# Patient Record
Sex: FEMALE | Race: WHITE | NOT HISPANIC OR LATINO | Employment: FULL TIME | URBAN - METROPOLITAN AREA
[De-identification: names, ages, dates, MRNs, and addresses within clinical notes are randomized per-mention and may not be internally consistent; named-entity substitution may affect disease eponyms.]

---

## 2019-08-02 ENCOUNTER — OFFICE VISIT (OUTPATIENT)
Dept: OBGYN | Facility: CLINIC | Age: 29
End: 2019-08-02
Attending: OBSTETRICS & GYNECOLOGY
Payer: COMMERCIAL

## 2019-08-02 VITALS
DIASTOLIC BLOOD PRESSURE: 86 MMHG | BODY MASS INDEX: 22.18 KG/M2 | WEIGHT: 138 LBS | SYSTOLIC BLOOD PRESSURE: 130 MMHG | HEART RATE: 63 BPM | HEIGHT: 66 IN

## 2019-08-02 DIAGNOSIS — Z11.3 SCREENING EXAMINATION FOR VENEREAL DISEASE: ICD-10-CM

## 2019-08-02 DIAGNOSIS — Z00.00 VISIT FOR PREVENTIVE HEALTH EXAMINATION: Primary | ICD-10-CM

## 2019-08-02 DIAGNOSIS — Z12.4 SCREENING FOR MALIGNANT NEOPLASM OF CERVIX: ICD-10-CM

## 2019-08-02 DIAGNOSIS — Z01.419 ENCOUNTER FOR GYNECOLOGICAL EXAMINATION WITHOUT ABNORMAL FINDING: ICD-10-CM

## 2019-08-02 PROCEDURE — G0145 SCR C/V CYTO,THINLAYER,RESCR: HCPCS | Performed by: OBSTETRICS & GYNECOLOGY

## 2019-08-02 PROCEDURE — 87491 CHLMYD TRACH DNA AMP PROBE: CPT | Performed by: OBSTETRICS & GYNECOLOGY

## 2019-08-02 PROCEDURE — 87624 HPV HI-RISK TYP POOLED RSLT: CPT | Performed by: OBSTETRICS & GYNECOLOGY

## 2019-08-02 PROCEDURE — G0463 HOSPITAL OUTPT CLINIC VISIT: HCPCS | Mod: ZF

## 2019-08-02 PROCEDURE — 87591 N.GONORRHOEAE DNA AMP PROB: CPT | Performed by: OBSTETRICS & GYNECOLOGY

## 2019-08-02 SDOH — HEALTH STABILITY: MENTAL HEALTH: HOW OFTEN DO YOU HAVE A DRINK CONTAINING ALCOHOL?: MONTHLY OR LESS

## 2019-08-02 ASSESSMENT — PATIENT HEALTH QUESTIONNAIRE - PHQ9
SUM OF ALL RESPONSES TO PHQ QUESTIONS 1-9: 0
5. POOR APPETITE OR OVEREATING: NOT AT ALL

## 2019-08-02 ASSESSMENT — ANXIETY QUESTIONNAIRES
2. NOT BEING ABLE TO STOP OR CONTROL WORRYING: NOT AT ALL
1. FEELING NERVOUS, ANXIOUS, OR ON EDGE: NOT AT ALL
5. BEING SO RESTLESS THAT IT IS HARD TO SIT STILL: NOT AT ALL
3. WORRYING TOO MUCH ABOUT DIFFERENT THINGS: NOT AT ALL
6. BECOMING EASILY ANNOYED OR IRRITABLE: NOT AT ALL
GAD7 TOTAL SCORE: 0
7. FEELING AFRAID AS IF SOMETHING AWFUL MIGHT HAPPEN: NOT AT ALL

## 2019-08-02 ASSESSMENT — MIFFLIN-ST. JEOR: SCORE: 1362.71

## 2019-08-02 NOTE — PROGRESS NOTES
SUBJECTIVE   Soraida Meeks is a 29 year old G0, No LMP recorded. (Menstrual status: IUD)., here for an annual preventive exam.    Specific concerns today:  -- friability with paps, occasional postcoital spotting  -- due for pap    Gynecologic History  No LMP recorded. (Menstrual status: IUD).   Current contraception: LNG IUD  Desires pregnancy within 12 months: N  Current partner from [facility], met through med school friend  Denies history of STI  No results found for: PAP   History of abnormal Pap smear: remote h/o abnormal (19 or 20, normal follow up)    Obstetric History  OB History    Para Term  AB Living   0 0 0 0 0 0   SAB TAB Ectopic Multiple Live Births   0 0 0 0 0        Health Maintenance    There is no immunization history on file for this patient.  Health Maintenance   Topic Date Due     PREVENTIVE CARE VISIT  1989     PAP  1989     HIV SCREENING  2004     DTAP/TDAP/TD IMMUNIZATION (1 - Tdap) 2014     PHQ-2  2019     INFLUENZA VACCINE (1) 2019     IPV IMMUNIZATION  Aged Out     MENINGITIS IMMUNIZATION  Aged Out     No results found for: CHOL  No results found for: HDL  No results found for: LDL  No results found for: TSH    Colonoscopy n/a  Mammogram n/a    Past Medical History  Past Medical History:   Diagnosis Date     NO ACTIVE PROBLEMS        Past Surgical History  Past Surgical History:   Procedure Laterality Date     NO HISTORY OF SURGERY         Medications  Current Outpatient Medications   Medication     levonorgestrel (MIRENA) 20 MCG/24HR IUD     No current facility-administered medications for this visit.        Allergies   No Known Allergies    Social History  Social History     Socioeconomic History     Marital status: Single     Spouse name: Not on file     Number of children: Not on file     Years of education: Not on file     Highest education level: Not on file   Occupational History     Occupation: doctor     Employer: [employer]     Comment:  "gyn/onc fellow   Social Needs     Financial resource strain: Not on file     Food insecurity:     Worry: Not on file     Inability: Not on file     Transportation needs:     Medical: Not on file     Non-medical: Not on file   Tobacco Use     Smoking status: Never Smoker     Smokeless tobacco: Never Used   Substance and Sexual Activity     Alcohol use: Yes     Frequency: Monthly or less     Drug use: Never     Sexual activity: Yes     Partners: Male     Birth control/protection: IUD   Lifestyle     Physical activity:     Days per week: Not on file     Minutes per session: Not on file     Stress: Not on file   Relationships     Social connections:     Talks on phone: Not on file     Gets together: Not on file     Attends Muslim service: Not on file     Active member of club or organization: Not on file     Attends meetings of clubs or organizations: Not on file     Relationship status: Not on file     Intimate partner violence:     Fear of current or ex partner: Not on file     Emotionally abused: Not on file     Physically abused: Not on file     Forced sexual activity: Not on file   Other Topics Concern     Not on file   Social History Narrative    Grew up [facility]    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 No pertinent family history.  No family history of breast, uterine, ovarian or colon cancer.    Review of Systems  CONSTITUTIONAL: NEGATIVE for fever, chills  EYES: NEGATIVE for vision changes   RESP: NEGATIVE for significant cough or SOB  CV: NEGATIVE for chest pain, palpitations   GI: NEGATIVE for nausea, abdominal pain, heartburn, or change in bowel habits  : NEGATIVE for frequency, dysuria, or hematuria  MUSCULOSKELETAL: NEGATIVE for significant arthralgias or myalgia  NEURO: NEGATIVE for weakness, dizziness or paresthesias or headache    OBJECTIVE   /86   Pulse 63   Ht 1.676 m (5' 6\")   Wt 62.6 kg (138 lb)   BMI 22.27 kg/m  "   BMI: Body mass index is 22.27 kg/m .  General:  Alert, no distress   HEENT:  Normocephalic, without obvious abnormality  No thyromegaly   Breasts: Within normal limits bilaterally, no LAD   Lungs:  Clear to auscultation bilaterally   Heart:  Regular rate and rhythm, no murmur   Abdomen:  Soft, non-tender, non-distended, bowel sounds normal   Pelvic: -nefg  -vagina normal without discharge  -cervix normal in appearance, no masses, IUD strings seen through external os, much friability with pap, moderate amount ectropion  -uterus small, AV, mobile, NT  -no adnexal masses, NT  -anus, perineum wnl   Extremities:  normal       ASSESSMENT   Soraida Meeks is a 29 year old , annual preventive exam within normal limits.    PLAN   Age 19-39 Annual Preventive Exam  1.  Screening   Ng/Ct testing today   HIV, syphilis declined   Cotesting today    2.  Immunizations UTD    3.  Patient Education   a.  Additional teaching done at this visit included: birth control   b.  Discussed with patient risks/ benefits and treatment options of prescribed medications or other treatment modalities.   c.  Recommended folate 0.4 - 0.8 mg daily for women of reproductive age    RTC in one year for annual exam or with concerns.    Fay Alatorre MD MPH

## 2019-08-02 NOTE — PATIENT INSTRUCTIONS

## 2019-08-02 NOTE — LETTER
2019       RE: Soraida Meeks  [address]  [address]  [place] [address]     Dear Colleague,    Thank you for referring your patient, Soraida Meeks, to the [facility] at [facility]. Please see a copy of my visit note below.    SUBJECTIVE   Soraida Meeks is a 29 year old G0, No LMP recorded. (Menstrual status: IUD)., here for an annual preventive exam.    Specific concerns today:  -- friability with paps, occasional postcoital spotting  -- due for pap    Gynecologic History  No LMP recorded. (Menstrual status: IUD).   Current contraception: LNG IUD  Desires pregnancy within 12 months: N  Current partner from [facility], met through med school friend  Denies history of STI  No results found for: PAP   History of abnormal Pap smear: remote h/o abnormal (19 or 20, normal follow up)    Obstetric History  OB History    Para Term  AB Living   0 0 0 0 0 0   SAB TAB Ectopic Multiple Live Births   0 0 0 0 0        Health Maintenance    There is no immunization history on file for this patient.  Health Maintenance   Topic Date Due     PREVENTIVE CARE VISIT  1989     PAP  1989     HIV SCREENING  2004     DTAP/TDAP/TD IMMUNIZATION (1 - Tdap) 2014     PHQ-2  2019     INFLUENZA VACCINE (1) 2019     IPV IMMUNIZATION  Aged Out     MENINGITIS IMMUNIZATION  Aged Out     No results found for: CHOL  No results found for: HDL  No results found for: LDL  No results found for: TSH    Colonoscopy n/a  Mammogram n/a    Past Medical History  Past Medical History:   Diagnosis Date     NO ACTIVE PROBLEMS        Past Surgical History  Past Surgical History:   Procedure Laterality Date     NO HISTORY OF SURGERY         Medications  Current Outpatient Medications   Medication     levonorgestrel (MIRENA) 20 MCG/24HR IUD     No current facility-administered medications for this visit.        Allergies   No Known  Allergies    Social History  Social History     Socioeconomic History     Marital status: Single     Spouse name: Not on file     Number of children: Not on file     Years of education: Not on file     Highest education level: Not on file   Occupational History     Occupation: doctor     Employer: [employer]     Comment: gyn/onc fellow   Social Needs     Financial resource strain: Not on file     Food insecurity:     Worry: Not on file     Inability: Not on file     Transportation needs:     Medical: Not on file     Non-medical: Not on file   Tobacco Use     Smoking status: Never Smoker     Smokeless tobacco: Never Used   Substance and Sexual Activity     Alcohol use: Yes     Frequency: Monthly or less     Drug use: Never     Sexual activity: Yes     Partners: Male     Birth control/protection: IUD   Lifestyle     Physical activity:     Days per week: Not on file     Minutes per session: Not on file     Stress: Not on file   Relationships     Social connections:     Talks on phone: Not on file     Gets together: Not on file     Attends [employer] service: Not on file     Active member of club or organization: Not on file     Attends meetings of clubs or organizations: Not on file     Relationship status: Not on file     Intimate partner violence:     Fear of current or ex partner: Not on file     Emotionally abused: Not on file     Physically abused: Not on file     Forced sexual activity: Not on file   Other Topics Concern     Not on file   Social History Narrative    Grew up [facility] [facility]    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 No pertinent family history.  No family history of breast, uterine, ovarian or colon cancer.    Review of Systems  CONSTITUTIONAL: NEGATIVE for fever, chills  EYES: NEGATIVE for vision changes   RESP: NEGATIVE for significant cough or SOB  CV: NEGATIVE for chest pain, palpitations   GI: NEGATIVE for nausea, abdominal pain,  "heartburn, or change in bowel habits  : NEGATIVE for frequency, dysuria, or hematuria  MUSCULOSKELETAL: NEGATIVE for significant arthralgias or myalgia  NEURO: NEGATIVE for weakness, dizziness or paresthesias or headache    OBJECTIVE   /86   Pulse 63   Ht 1.676 m (5' 6\")   Wt 62.6 kg (138 lb)   BMI 22.27 kg/m     BMI: Body mass index is 22.27 kg/m .  General:  Alert, no distress   HEENT:  Normocephalic, without obvious abnormality  No thyromegaly   Breasts: Within normal limits bilaterally, no LAD   Lungs:  Clear to auscultation bilaterally   Heart:  Regular rate and rhythm, no murmur   Abdomen:  Soft, non-tender, non-distended, bowel sounds normal   Pelvic: -nefg  -vagina normal without discharge  -cervix normal in appearance, no masses, IUD strings seen through external os, much friability with pap, moderate amount ectropion  -uterus small, AV, mobile, NT  -no adnexal masses, NT  -anus, perineum wnl   Extremities:  normal       ASSESSMENT   Soraida Meeks is a 29 year old , annual preventive exam within normal limits.    PLAN   Age 19-39 Annual Preventive Exam  1.  Screening   Ng/Ct testing today   HIV, syphilis declined   Cotesting today    2.  Immunizations UTD    3.  Patient Education   a.  Additional teaching done at this visit included: birth control   b.  Discussed with patient risks/ benefits and treatment options of prescribed medications or other treatment modalities.   c.  Recommended folate 0.4 - 0.8 mg daily for women of reproductive age    RTC in one year for annual exam or with concerns.    Fay Alatorre MD, MPH          "

## 2019-08-03 ASSESSMENT — ANXIETY QUESTIONNAIRES: GAD7 TOTAL SCORE: 0

## 2019-08-04 LAB
C TRACH DNA SPEC QL NAA+PROBE: NEGATIVE
N GONORRHOEA DNA SPEC QL NAA+PROBE: NEGATIVE
SPECIMEN SOURCE: NORMAL
SPECIMEN SOURCE: NORMAL

## 2019-08-07 LAB
COPATH REPORT: NORMAL
PAP: NORMAL

## 2019-08-08 LAB
FINAL DIAGNOSIS: NORMAL
HPV HR 12 DNA CVX QL NAA+PROBE: NEGATIVE
HPV16 DNA SPEC QL NAA+PROBE: NEGATIVE
HPV18 DNA SPEC QL NAA+PROBE: NEGATIVE
SPECIMEN DESCRIPTION: NORMAL
SPECIMEN SOURCE CVX/VAG CYTO: NORMAL

## 2019-09-04 DIAGNOSIS — J30.89 SEASONAL ALLERGIC RHINITIS DUE TO OTHER ALLERGIC TRIGGER: Primary | ICD-10-CM

## 2019-09-04 DIAGNOSIS — L70.8 OTHER ACNE: ICD-10-CM

## 2019-09-04 RX ORDER — ESCITALOPRAM OXALATE 5 MG/1
5 TABLET ORAL DAILY
Qty: 90 TABLET | Refills: 0 | Status: SHIPPED | OUTPATIENT
Start: 2019-09-04 | End: 2020-02-05

## 2019-09-04 RX ORDER — TRETINOIN 0.5 MG/G
1 CREAM TOPICAL AT BEDTIME
COMMUNITY
End: 2019-09-04

## 2019-09-04 RX ORDER — TRETINOIN 0.5 MG/G
CREAM TOPICAL AT BEDTIME
Qty: 20 G | Refills: 0 | Status: SHIPPED | OUTPATIENT
Start: 2019-09-04 | End: 2022-03-23

## 2019-12-13 ENCOUNTER — OFFICE VISIT (OUTPATIENT)
Dept: FAMILY MEDICINE | Facility: CLINIC | Age: 29
End: 2019-12-13
Payer: COMMERCIAL

## 2019-12-13 VITALS
HEART RATE: 62 BPM | HEIGHT: 66 IN | RESPIRATION RATE: 16 BRPM | WEIGHT: 140.1 LBS | TEMPERATURE: 98.1 F | SYSTOLIC BLOOD PRESSURE: 116 MMHG | OXYGEN SATURATION: 99 % | BODY MASS INDEX: 22.52 KG/M2 | DIASTOLIC BLOOD PRESSURE: 78 MMHG

## 2019-12-13 DIAGNOSIS — R59.9 ENLARGED LYMPH NODES: Primary | ICD-10-CM

## 2019-12-13 DIAGNOSIS — R59.9 ENLARGED LYMPH NODES: ICD-10-CM

## 2019-12-13 LAB
ERYTHROCYTE [DISTWIDTH] IN BLOOD BY AUTOMATED COUNT: 11.9 % (ref 10–15)
HCT VFR BLD AUTO: 40.6 % (ref 35–47)
HGB BLD-MCNC: 13.8 G/DL (ref 11.7–15.7)
MCH RBC QN AUTO: 31.4 PG (ref 26.5–33)
MCHC RBC AUTO-ENTMCNC: 34 G/DL (ref 31.5–36.5)
MCV RBC AUTO: 93 FL (ref 78–100)
PLATELET # BLD AUTO: 130 10E9/L (ref 150–450)
RBC # BLD AUTO: 4.39 10E12/L (ref 3.8–5.2)
WBC # BLD AUTO: 4.3 10E9/L (ref 4–11)

## 2019-12-13 ASSESSMENT — MIFFLIN-ST. JEOR: SCORE: 1372.24

## 2019-12-13 ASSESSMENT — PAIN SCALES - GENERAL: PAINLEVEL: NO PAIN (0)

## 2019-12-13 NOTE — PROGRESS NOTES
"       HPI       Soraida Meeks is a 29 year old woman who presents for assessment of her left sided neck lymph nodes. She noticed an enlarged node on the left side when working out. Today she feels a slight sore throat. She did notice today that there a few more enlarged lymph nodes behind the largest one.   Chief Complaint   Patient presents with     Mass     Pt complains of a lump on the left side of her neck.     Problem, Medication and Allergy Lists were reviewed and updated if needed.    Patient is not an established patient of this clinic.           Review of Systems:   Review of Systems     Constitutional:  Negative for fever, chills and fatigue.   HENT:  Positive for neck mass.                Physical Exam:     Vitals:    12/13/19 1002   BP: 116/78   BP Location: Right arm   Patient Position: Sitting   Cuff Size: Adult Regular   Pulse: 62   Resp: 16   Temp: 98.1  F (36.7  C)   TempSrc: Oral   SpO2: 99%   Weight: 63.5 kg (140 lb 1.6 oz)   Height: 1.676 m (5' 6\")     Body mass index is 22.61 kg/m .  Vitals were reviewed and were normal.     Physical Exam  Constitutional:       Appearance: Normal appearance.   HENT:      Head: Normocephalic.      Right Ear: Hearing, tympanic membrane, ear canal and external ear normal.      Left Ear: Hearing, tympanic membrane, ear canal and external ear normal.      Nose: Nose normal.      Mouth/Throat:      Lips: Pink.      Mouth: Mucous membranes are moist.      Pharynx: Oropharynx is clear.   Musculoskeletal: Normal range of motion.   Lymphadenopathy:      Head:      Left side of head: Preauricular and posterior auricular adenopathy present.   Skin:     General: Skin is warm and dry.   Neurological:      General: No focal deficit present.      Mental Status: She is alert and oriented to person, place, and time.   Psychiatric:         Mood and Affect: Mood normal.         Behavior: Behavior normal.         Results:     US and lab pending.     Assessment and Plan     1. " Enlarged lymph nodes    - US Head Neck Soft Tissue; Future  - CBC with platelets; Future    There are no discontinued medications.  First, have your CBC and US completed. Next, I will call you with the results. Next, drink 70 oz water daily. Next, take a probiotic daily. Please consider hot pack your lymph nodes if uncomfortable. Thank you. Options for treatment and follow-up care were reviewed with the patient. Soraida Meeks  engaged in the decision making process and verbalized understanding of the options discussed and agreed with the final plan.  NIESHA Carlson, CNP  Addendum 12/16/2019 0820: I called and left a message stating that her CBC was normal and her US of her neck showed enlarged lymph nodes. If the lymph nodes have not resolved in 6-8 weeks, she should return for a follow up US.   NIESHA Carlson, CNP

## 2019-12-13 NOTE — PATIENT INSTRUCTIONS
First, have your CBC and US completed. Next, I will call you with the results. Next, drink 70 oz water daily. Next, take a probiotic daily. Please consider hot pack your lymph nodes if uncomfortable. Thank you.   [facility] Medication Refill Request Information:  * Please contact your pharmacy regarding ANY request for medication refills.  ** NP Clinic Prescription Fax = [phone]  * Please allow 3 business days for routine medication refills.  * Please allow 5 business days for controlled substance medication refills.     [facility] Test Result notification information:  *You will be notified with in 7-10 days of your appointment day regarding the results of your test.  If you are on MyChart you will be notified as soon as the provider has reviewed the results and signed off on them.    [facility]: [phone]

## 2019-12-13 NOTE — NURSING NOTE
Chief Complaint   Patient presents with     Mass     Pt complains of a lump on the left side of her neck.         Derick Patterson, EMT on 12/13/2019 at 10:02 AM

## 2019-12-14 ENCOUNTER — ANCILLARY PROCEDURE (OUTPATIENT)
Dept: ULTRASOUND IMAGING | Facility: CLINIC | Age: 29
End: 2019-12-14
Attending: NURSE PRACTITIONER
Payer: COMMERCIAL

## 2019-12-14 DIAGNOSIS — R59.9 ENLARGED LYMPH NODES: ICD-10-CM

## 2019-12-16 ASSESSMENT — ENCOUNTER SYMPTOMS
NECK MASS: 1
FATIGUE: 0
FEVER: 0
CHILLS: 0

## 2020-01-03 DIAGNOSIS — D69.6 THROMBOCYTOPENIA, UNSPECIFIED (H): Primary | ICD-10-CM

## 2020-01-03 DIAGNOSIS — R59.9 ENLARGED LYMPH NODES: ICD-10-CM

## 2020-01-03 DIAGNOSIS — D69.6 THROMBOCYTOPENIA (H): Primary | ICD-10-CM

## 2020-01-03 DIAGNOSIS — R59.9 SWELLING OF LYMPH NODES: ICD-10-CM

## 2020-01-03 LAB
BASOPHILS # BLD AUTO: 0.1 10E9/L (ref 0–0.2)
BASOPHILS NFR BLD AUTO: 1 %
DIFFERENTIAL METHOD BLD: NORMAL
EOSINOPHIL # BLD AUTO: 0.1 10E9/L (ref 0–0.7)
EOSINOPHIL NFR BLD AUTO: 0.7 %
ERYTHROCYTE [DISTWIDTH] IN BLOOD BY AUTOMATED COUNT: 12.4 % (ref 10–15)
HCT VFR BLD AUTO: 39.5 % (ref 35–47)
HETEROPH AB SER QL: POSITIVE
HGB BLD-MCNC: 13.5 G/DL (ref 11.7–15.7)
IMM GRANULOCYTES # BLD: 0 10E9/L (ref 0–0.4)
IMM GRANULOCYTES NFR BLD: 0.4 %
LYMPHOCYTES # BLD AUTO: 3.3 10E9/L (ref 0.8–5.3)
LYMPHOCYTES NFR BLD AUTO: 45.2 %
MCH RBC QN AUTO: 30.8 PG (ref 26.5–33)
MCHC RBC AUTO-ENTMCNC: 34.2 G/DL (ref 31.5–36.5)
MCV RBC AUTO: 90 FL (ref 78–100)
MONOCYTES # BLD AUTO: 0.7 10E9/L (ref 0–1.3)
MONOCYTES NFR BLD AUTO: 9.3 %
NEUTROPHILS # BLD AUTO: 3.2 10E9/L (ref 1.6–8.3)
NEUTROPHILS NFR BLD AUTO: 43.4 %
NRBC # BLD AUTO: 0 10*3/UL
NRBC BLD AUTO-RTO: 0 /100
PLATELET # BLD AUTO: 220 10E9/L (ref 150–450)
RBC # BLD AUTO: 4.38 10E12/L (ref 3.8–5.2)
WBC # BLD AUTO: 7.4 10E9/L (ref 4–11)

## 2020-01-27 ENCOUNTER — OFFICE VISIT (OUTPATIENT)
Dept: FAMILY MEDICINE | Facility: CLINIC | Age: 30
End: 2020-01-27
Payer: COMMERCIAL

## 2020-01-27 VITALS — TEMPERATURE: 97.4 F | SYSTOLIC BLOOD PRESSURE: 125 MMHG | DIASTOLIC BLOOD PRESSURE: 79 MMHG

## 2020-01-27 DIAGNOSIS — Z71.84 TRAVEL ADVICE ENCOUNTER: Primary | ICD-10-CM

## 2020-01-27 PROCEDURE — 90471 IMMUNIZATION ADMIN: CPT | Mod: GA | Performed by: NURSE PRACTITIONER

## 2020-01-27 PROCEDURE — 90734 MENACWYD/MENACWYCRM VACC IM: CPT | Mod: GA | Performed by: NURSE PRACTITIONER

## 2020-01-27 PROCEDURE — 90717 YELLOW FEVER VACCINE SUBQ: CPT | Mod: GA | Performed by: NURSE PRACTITIONER

## 2020-01-27 PROCEDURE — 90691 TYPHOID VACCINE IM: CPT | Mod: GA | Performed by: NURSE PRACTITIONER

## 2020-01-27 PROCEDURE — 99402 PREV MED CNSL INDIV APPRX 30: CPT | Mod: 25 | Performed by: NURSE PRACTITIONER

## 2020-01-27 PROCEDURE — 90472 IMMUNIZATION ADMIN EACH ADD: CPT | Mod: GA | Performed by: NURSE PRACTITIONER

## 2020-01-27 RX ORDER — ATOVAQUONE AND PROGUANIL HYDROCHLORIDE 250; 100 MG/1; MG/1
1 TABLET, FILM COATED ORAL DAILY
Qty: 12 TABLET | Refills: 0 | Status: SHIPPED | OUTPATIENT
Start: 2020-01-27 | End: 2022-03-23

## 2020-01-27 RX ORDER — AZITHROMYCIN 500 MG/1
500 TABLET, FILM COATED ORAL DAILY
Qty: 3 TABLET | Refills: 0 | Status: SHIPPED | OUTPATIENT
Start: 2020-01-27 | End: 2020-01-30

## 2020-01-27 NOTE — NURSING NOTE
"Chief Complaint   Patient presents with     [facility]     initial /79 (BP Location: Right arm)   Temp 97.4  F (36.3  C) (Oral)  Estimated body mass index is 22.61 kg/m  as calculated from the following:    Height as of 12/13/19: 1.676 m (5' 6\").    Weight as of 12/13/19: 63.5 kg (140 lb 1.6 oz).  BP completed using cuff size: regular.  L  arm      Health Maintenance that is potentially due pending provider review:  NONE    n/a    Dave Fontana ma  "

## 2020-01-27 NOTE — PROGRESS NOTES
Nurse Note      Itinerary:  [place] and [place]      Departure Date: 2/8/20      Return Date: 2/24/20      Length of Trip 2 weeks      Reason for Travel: Business           Urban or rural: both      Accommodations: Hotel        IMMUNIZATION HISTORY  Have you received any immunizations within the past 4 weeks?  No  Have you ever fainted from having your blood drawn or from an injection?  No  Have you ever had a fever reaction to vaccination?  No  Have you ever had any bad reaction or side effect from any vaccination?  No  Have you ever had hepatitis A or B vaccine?  Yes  Do you live (or work closely) with anyone who has AIDS, an AIDS-like condition, any other immune disorder or who is on chemotherapy for cancer?  No  Do you have a family history of immunodeficiency?  No  Have you received any injection of immune globulin or any blood products during the past 12 months?  No    Patient roomed by   Dave Meeks is a 29 year old female seen today alone for counsultation for international travel to the stated countries.   Patient will be departing in  12 day(s) and  traveling [employer] gyn group.      Patient itinerary :  will be in the urban region of  [place] and then [place]  which presents risk for Malaria and Yellow Fever. exposure.      Patient's activities will include hospital/medical .    Patient's country of birth is [place]    Special medical concerns: none  Pre-travel questionnaire was completed by patient and reviewed by provider.     Vitals: /79 (BP Location: Right arm)   Temp 97.4  F (36.3  C) (Oral)   BMI= There is no height or weight on file to calculate BMI.    EXAM:  General:  Well-nourished, well-developed in no acute distress.  Appears to be stated age, interacts appropriately and expresses understanding of information given to patient.    Current Outpatient Medications   Medication Sig Dispense Refill     escitalopram (LEXAPRO) 5 MG tablet Take 1 tablet (5  mg) by mouth daily (Patient not taking: Reported on 12/13/2019) 90 tablet 0     levonorgestrel (MIRENA) 20 MCG/24HR IUD 1 each by Intrauterine route once       tretinoin (RETIN-A) 0.05 % external cream Apply topically At Bedtime (Patient not taking: Reported on 12/13/2019) 20 g 0     There is no problem list on file for this patient.    No Known Allergies      Immunizations discussed include:   Hepatitis A:  Up to date  Hepatitis B: Up to date  Influenza: Up to date  Typhoid: Ordered/given today, risks, benefits and side effects reviewed  Rabies: Up to date  Yellow Fever: Stamaril Ordered/given today - consent completed, side effects, precautions, allergies, risks discussed. Patient expressed understanding.  Tajik Encephalitis: Not indicated  Meningococcus: Ordered/given today, risks, benefits and side effects reviewed  Tetanus/Diphtheria: Up to date  Measles/Mumps/Rubella: Up to date  Cholera: Not needed  Polio: Up to date  Pneumococcal: Under age of 65  Varicella: Immune by disease history per patient report  Zostavax:  Not indicated  Shingrix: Not indicated  HPV:  Not indicated  TB:  Low risk     Stamaril Informed Consent    The patient was provided with a copy of the IRB-approved consent form and all questions were answered before the patient agreed to participate by signing the informed consent document.   A copy of the form was provided to the patient.    Date: January 27, 2020   Consent Version Date: 5/10/2017   Consent Obtained by:  Margaret Newsome CNP (Lori)     HIPAA:  Yes  HIPAA Authorization Signed Date: January 27, 2020       Inclusion/Exclusion Criteria:    (Similar to Yellow Fever-VAX)      The patient met all of the following inclusion criteria in order to be eligible for the Stamaril vaccination under this EAP (Expanded Access Investigational New Drug Program)           At increased risk for YF, including researchers, laboratory workers, vaccine production staff, and those who are traveling within  30 days to a YF-endemic region or to a country requiring proof of YF vaccination under IHRs (International Health Regulations)?       Yes     Patient is greater than or equal to 9 months of age on the day of vaccination?     Yes     Patient is greater than or equal to 18 years of age and signed and dated the Consent Forms?     Yes     Patient is < 18 years of age and parent(s)/guardian(s) signed and dated the Consent Forms?      Patient is 7 years to < 18 years of age and signed and dated the Assent form?        No Assent is required.  Patient is <7 years of age.     No      No      N/A     The patient did not meet any of the following criteria that would have excluded the patient from receiving the Stamaril vaccination under this EAP              Patient is less than 9 months of age.       No     The patient is breast-feeding and cannot stop nursing for at least 14 days after vaccination.    Note: Yellow Fever vaccine virus may be transmissible via breast milk by nursing mothers who are vaccinated during the final 2 weeks of pregnancy or post-partum.   Following transmission, infants may develop encephalitis.  The minimum time of discontinuation of breastfeeding for 14 days after vaccination is based on the expected clearance of live-attenuated vaccine virus.       No     The patient is immunosuppressed, whether congenital or idiopathic, including for example, leukemia, lymphoma, other malignancies, and patients who are receiving immunosuppressant medications (e.g. Systemic corticosteroids [greater than the standard dose of topical or inhaled steroids], alkylating drugs, antimetabolites, of other cytotoxic or immunomodulatory drugs) or radiation therapy or organ transplantation.       No     The patient has known hypersensitivity to the active substance or to any of the excipients of Stamaril vaccine or to eggs or chicken proteins.     No     The patient is symptomatic for human immunodeficiency virus (HIV)  infection     No     The patient is asymptomatic for HIV infection but accompanied by evidence of severe immune suppression    Note:  Evidence of severe immune suppression includes CD4+ T-cell counts < 200 cubic millimeters (or < 15% total lymphocytes in children aged < 6 years), or as determined by the health care provider.       No     The patient has a history of thymus dysfunction (including myasthenia gravis, thymoma, thymectomy)     No     Moderate or severe febrile illness or acute illness    Note: Participation in the EAP can be reassessed when moderate or severe febrile illness or acute illness has resolved.       No               Altitude Exposure on this trip: no  Past tolerance to Altitude: na    ASSESSMENT/PLAN:    ICD-10-CM    1. Travel advice encounter Z71.84 atovaquone-proguanil (MALARONE) 250-100 MG tablet     azithromycin (ZITHROMAX) 500 MG tablet     I have reviewed general recommendations for safe travel   including: food/water precautions, insect precautions, safer sex   practices given high prevalence of Zika, HIV and other STDs,   roadway safety. Educational materials and [employer] report provided.    Malaraia prophylaxis recommended: Malarone  Symptomatic treatment for traveler's diarrhea: azithromycin  Altitude illness prevention and treatment: none      Evacuation insurance advised and resources were provided to patient.    Total visit time 30 minutes  with over 50% of time spent counseling patient as detailed above.    Margaret Newsome CNP

## 2020-01-27 NOTE — PATIENT INSTRUCTIONS
Today January 27, 2020 you received the    Yellow Fever (YF)    Meningococcal (Menactra) Vaccine    Typhoid - injectable. This vaccine is valid for two years.   .    These appointments can be made as a NURSE ONLY visit.    **It is very important for the vaccinations to be given on the scheduled day(s), this helps ensure you receive the full effectiveness of the vaccine.**    Please call [facility] with any questions [phone]    Thank you for visiting [facility]

## 2020-02-05 DIAGNOSIS — Z78.9 PATIENT TRAVELS: Primary | ICD-10-CM

## 2020-02-05 RX ORDER — ONDANSETRON 4 MG/1
4 TABLET, ORALLY DISINTEGRATING ORAL EVERY 8 HOURS PRN
Qty: 10 TABLET | Refills: 1 | Status: SHIPPED | OUTPATIENT
Start: 2020-02-05 | End: 2020-07-01

## 2020-07-01 ENCOUNTER — TELEPHONE (OUTPATIENT)
Dept: ONCOLOGY | Facility: CLINIC | Age: 30
End: 2020-07-01

## 2020-07-01 DIAGNOSIS — Z78.9 PATIENT TRAVELS: ICD-10-CM

## 2020-07-01 RX ORDER — ONDANSETRON 4 MG/1
4 TABLET, ORALLY DISINTEGRATING ORAL EVERY 8 HOURS PRN
Qty: 10 TABLET | Refills: 1 | Status: SHIPPED | OUTPATIENT
Start: 2020-07-01 | End: 2022-03-23

## 2020-11-28 DIAGNOSIS — H10.30 ACUTE CONJUNCTIVITIS, UNSPECIFIED ACUTE CONJUNCTIVITIS TYPE, UNSPECIFIED LATERALITY: Primary | ICD-10-CM

## 2020-11-28 RX ORDER — ESCITALOPRAM OXALATE 5 MG/1
5 TABLET ORAL DAILY
Qty: 90 TABLET | Refills: 3 | Status: SHIPPED | OUTPATIENT
Start: 2020-11-28 | End: 2021-02-21

## 2020-11-28 RX ORDER — NEOMYCIN SULFATE, POLYMYXIN B SULFATE, BACITRACIN ZINC, HYDROCORTISONE 3.5; 10000; 400; 1 MG/G; [USP'U]/G; [USP'U]/G; MG/G
0.25 OINTMENT OPHTHALMIC 2 TIMES DAILY
Qty: 3.5 G | Refills: 0 | Status: SHIPPED | OUTPATIENT
Start: 2020-11-28 | End: 2020-12-05

## 2021-01-03 ENCOUNTER — HEALTH MAINTENANCE LETTER (OUTPATIENT)
Age: 31
End: 2021-01-03

## 2021-02-21 DIAGNOSIS — H10.30 ACUTE CONJUNCTIVITIS, UNSPECIFIED ACUTE CONJUNCTIVITIS TYPE, UNSPECIFIED LATERALITY: ICD-10-CM

## 2021-02-21 RX ORDER — ESCITALOPRAM OXALATE 5 MG/1
5 TABLET ORAL DAILY
Qty: 90 TABLET | Refills: 3 | Status: SHIPPED | OUTPATIENT
Start: 2021-02-21 | End: 2022-03-23

## 2021-08-10 DIAGNOSIS — R11.0 NAUSEA: Primary | ICD-10-CM

## 2021-08-10 RX ORDER — PYRIDOXINE HCL (VITAMIN B6) 25 MG
25 TABLET ORAL 3 TIMES DAILY
Qty: 90 TABLET | Refills: 1 | Status: SHIPPED | OUTPATIENT
Start: 2021-08-10 | End: 2022-03-23

## 2021-08-23 DIAGNOSIS — Z32.01 PREGNANCY TEST POSITIVE: Primary | ICD-10-CM

## 2021-09-07 DIAGNOSIS — Z32.01 PREGNANCY TEST POSITIVE: Primary | ICD-10-CM

## 2021-09-10 ENCOUNTER — TELEPHONE (OUTPATIENT)
Dept: OBGYN | Facility: CLINIC | Age: 31
End: 2021-09-10

## 2021-09-10 NOTE — TELEPHONE ENCOUNTER
Patient only wanted dating US.  Scheduled for  [id number] per pt choice.   She said she would schedule appt for obi later.     Thank you     Malvin

## 2021-09-10 NOTE — TELEPHONE ENCOUNTER
[facility] Call Center    Phone Message    May a detailed message be left on voicemail: yes     Reason for Call: Other: Pt would like a call back to get an ultra sound scheduled      Action Taken: Message routed to:  [facility] RADHA    Travel Screening: Not Applicable

## 2021-09-20 ENCOUNTER — ANCILLARY PROCEDURE (OUTPATIENT)
Dept: ULTRASOUND IMAGING | Facility: CLINIC | Age: 31
End: 2021-09-20
Attending: OBSTETRICS & GYNECOLOGY
Payer: COMMERCIAL

## 2021-09-20 DIAGNOSIS — Z32.01 PREGNANCY TEST POSITIVE: ICD-10-CM

## 2021-09-20 PROCEDURE — 76817 TRANSVAGINAL US OBSTETRIC: CPT

## 2021-09-23 DIAGNOSIS — Z34.01 NORMAL FIRST PREGNANCY CONFIRMED, CURRENTLY IN FIRST TRIMESTER: Primary | ICD-10-CM

## 2021-09-23 DIAGNOSIS — O21.9 NAUSEA AND VOMITING DURING PREGNANCY: Primary | ICD-10-CM

## 2021-09-23 RX ORDER — METOCLOPRAMIDE 5 MG/1
5 TABLET ORAL 4 TIMES DAILY PRN
Qty: 20 TABLET | Refills: 0 | Status: SHIPPED | OUTPATIENT
Start: 2021-09-23 | End: 2022-03-23

## 2021-09-24 ENCOUNTER — TRANSCRIBE ORDERS (OUTPATIENT)
Dept: MATERNAL FETAL MEDICINE | Facility: CLINIC | Age: 31
End: 2021-09-24

## 2021-09-24 DIAGNOSIS — O26.90 PREGNANCY RELATED CONDITION, ANTEPARTUM: Primary | ICD-10-CM

## 2021-10-10 ENCOUNTER — HEALTH MAINTENANCE LETTER (OUTPATIENT)
Age: 31
End: 2021-10-10

## 2021-10-13 ENCOUNTER — PRE VISIT (OUTPATIENT)
Dept: MATERNAL FETAL MEDICINE | Facility: CLINIC | Age: 31
End: 2021-10-13

## 2021-10-18 ENCOUNTER — OFFICE VISIT (OUTPATIENT)
Dept: MATERNAL FETAL MEDICINE | Facility: CLINIC | Age: 31
End: 2021-10-18
Attending: OBSTETRICS & GYNECOLOGY
Payer: COMMERCIAL

## 2021-10-18 ENCOUNTER — HOSPITAL ENCOUNTER (OUTPATIENT)
Dept: ULTRASOUND IMAGING | Facility: CLINIC | Age: 31
End: 2021-10-18
Attending: OBSTETRICS & GYNECOLOGY
Payer: COMMERCIAL

## 2021-10-18 DIAGNOSIS — O26.90 PREGNANCY RELATED CONDITION, ANTEPARTUM: ICD-10-CM

## 2021-10-18 DIAGNOSIS — Z36.9 ENCOUNTER FOR ANTENATAL SCREENING: Primary | ICD-10-CM

## 2021-10-18 LAB
ABO/RH(D): NORMAL
ALT SERPL W P-5'-P-CCNC: 22 U/L (ref 0–50)
ANION GAP SERPL CALCULATED.3IONS-SCNC: 4 MMOL/L (ref 3–14)
ANTIBODY SCREEN: NEGATIVE
AST SERPL W P-5'-P-CCNC: 16 U/L (ref 0–45)
BUN SERPL-MCNC: 10 MG/DL (ref 7–30)
CALCIUM SERPL-MCNC: 9 MG/DL (ref 8.5–10.1)
CHLORIDE BLD-SCNC: 107 MMOL/L (ref 94–109)
CO2 SERPL-SCNC: 26 MMOL/L (ref 20–32)
CREAT SERPL-MCNC: 0.64 MG/DL (ref 0.52–1.04)
ERYTHROCYTE [DISTWIDTH] IN BLOOD BY AUTOMATED COUNT: 12.1 % (ref 10–15)
GFR SERPL CREATININE-BSD FRML MDRD: >90 ML/MIN/1.73M2
GLUCOSE BLD-MCNC: 75 MG/DL (ref 70–99)
HCT VFR BLD AUTO: 40 % (ref 35–47)
HGB BLD-MCNC: 14.3 G/DL (ref 11.7–15.7)
MCH RBC QN AUTO: 31.8 PG (ref 26.5–33)
MCHC RBC AUTO-ENTMCNC: 35.8 G/DL (ref 31.5–36.5)
MCV RBC AUTO: 89 FL (ref 78–100)
PLATELET # BLD AUTO: 236 10E3/UL (ref 150–450)
POTASSIUM BLD-SCNC: 4.2 MMOL/L (ref 3.4–5.3)
RBC # BLD AUTO: 4.5 10E6/UL (ref 3.8–5.2)
RUBV IGG SERPL QL IA: 2.66 INDEX
RUBV IGG SERPL QL IA: POSITIVE
SODIUM SERPL-SCNC: 137 MMOL/L (ref 133–144)
SPECIMEN EXPIRATION DATE: NORMAL
T PALLIDUM AB SER QL: NONREACTIVE
WBC # BLD AUTO: 10.3 10E3/UL (ref 4–11)

## 2021-10-18 PROCEDURE — 86780 TREPONEMA PALLIDUM: CPT | Performed by: OBSTETRICS & GYNECOLOGY

## 2021-10-18 PROCEDURE — 36415 COLL VENOUS BLD VENIPUNCTURE: CPT | Performed by: OBSTETRICS & GYNECOLOGY

## 2021-10-18 PROCEDURE — 86803 HEPATITIS C AB TEST: CPT | Performed by: OBSTETRICS & GYNECOLOGY

## 2021-10-18 PROCEDURE — 96040 HC GENETIC COUNSELING, EACH 30 MINUTES: CPT | Performed by: GENETIC COUNSELOR, MS

## 2021-10-18 PROCEDURE — 86762 RUBELLA ANTIBODY: CPT | Performed by: OBSTETRICS & GYNECOLOGY

## 2021-10-18 PROCEDURE — 76813 OB US NUCHAL MEAS 1 GEST: CPT | Mod: 26 | Performed by: OBSTETRICS & GYNECOLOGY

## 2021-10-18 PROCEDURE — 87389 HIV-1 AG W/HIV-1&-2 AB AG IA: CPT | Performed by: OBSTETRICS & GYNECOLOGY

## 2021-10-18 PROCEDURE — 76813 OB US NUCHAL MEAS 1 GEST: CPT

## 2021-10-18 PROCEDURE — 87340 HEPATITIS B SURFACE AG IA: CPT | Performed by: OBSTETRICS & GYNECOLOGY

## 2021-10-18 PROCEDURE — 86901 BLOOD TYPING SEROLOGIC RH(D): CPT | Performed by: OBSTETRICS & GYNECOLOGY

## 2021-10-18 PROCEDURE — 84450 TRANSFERASE (AST) (SGOT): CPT | Performed by: OBSTETRICS & GYNECOLOGY

## 2021-10-18 PROCEDURE — 80048 BASIC METABOLIC PNL TOTAL CA: CPT | Performed by: OBSTETRICS & GYNECOLOGY

## 2021-10-18 PROCEDURE — 85027 COMPLETE CBC AUTOMATED: CPT | Performed by: OBSTETRICS & GYNECOLOGY

## 2021-10-18 PROCEDURE — 87086 URINE CULTURE/COLONY COUNT: CPT | Performed by: OBSTETRICS & GYNECOLOGY

## 2021-10-18 PROCEDURE — 84460 ALANINE AMINO (ALT) (SGPT): CPT | Performed by: OBSTETRICS & GYNECOLOGY

## 2021-10-18 NOTE — PROGRESS NOTES
"Please see \"Imaging\" tab under \"Chart Review\" for details of today's visit.    Meagan March    "

## 2021-10-18 NOTE — PROGRESS NOTES
[facility]  Genetic Counseling Consult    Patient: Soraida Meeks YOB: 1990   Date of Service: 10/18/21      Soraida Meeks was seen at [facility] for genetic consultation to discuss the options for routine screening for fetal chromosome abnormalities. The patient was accompanied by her partner, Surya to today's visit.       Impression/Plan:   1.  Soraida had an ultrasound and blood draw for NIPT through [facility] lab.  Results are expected within 7-10 days, and will be available in [employer].  We will contact her to discuss the results, and a copy will be forwarded to the office of the referring OB provider. Soraida provided verbal permission for results to be left on her voicemail. They have opted out of fetal sex reporting.     2. Maternal serum AFP (single marker screen) is recommended after 15 weeks to screen for open neural tube defects. A quad screen should not be performed.    Pregnancy History:   /Parity:    Age at Delivery: 31 year old  DYLAN: 2022, by Last Menstrual Period  Gestational Age: 12w3d    No significant complications or exposures were reported in the current pregnancy.    Medical History:   Soraida s reported medical history is not expected to impact pregnancy management or risks to fetal development.       Family History:   A three-generation pedigree was obtained, and is scanned under the  Media  tab.   The following significant findings were reported by Soraida:    Soraida's partner, Surya is 39 and healthy.   It was reported that Soraida's sister was born with congenital hip dysplasia requiring casting and surgery. Hip dysplasia is a congenital condition of the hip joint that is thought to be multifactorial, meaning both genetic and environmental aspects and a combination of these aspects lead to the condition. Given this is a second degree relative to the pregnancy, the risk may be  increased. They were encouraged to share this family history with their pediatrician.   Soraida and Surya reported a significant family history of cancer. Soraida's maternal aunts/uncles were reported to have a history of skin cancers including basal cell and melanoma. Her maternal uncle passed away at age 3 due to leukemia. Her maternal grandparents were both reported to have lung cancer and were noted to be smokers. Surya's father passed away at age 43 due to colon cancer and he had a history of crohn's disease. Surya's paternal grandmother passed away at age 84 and was reported to have a history of abdominal or ovarian cancer. Surya's maternal uncle was reported to have hodgkins lymphoma. Surya's maternal grandmother was also thought to have a history of cancer. We discussed how most cancer seen in families occurs sporadically, but about 5-10% may be due to an underlying genetic etiology. The couple was encouraged to share this family history information with their primary care providers to ensure appropriate screening. They were also made aware of the [facility] cancer risk management clinic if they or their family members are interested in more information. Surya shared that he has undergone colonoscopy screening and was found to have ~6 polyps.     Soraida's paternal uncle was reported to have a history of seizures as well as mental and physical disabilities. Soraida believes he was born healthy and his condition was acquired. Seizure disorders can be isolated or part of a broader genetic syndrome. In the absence of an identified cause, risk assessment is challenging. They were encouraged to share this family history with their pediatrician.     Surya's paternal first cousin was reported to have Down syndrome. Over 95% of cases of Down syndrome result from trisomy 21 caused by nondisjunction.  Rarely, Down syndrome occurs due to a translocation involving chromosome 21, which, if carried by other family  members, puts them at increased risk to have a baby with Down syndrome. This is likely a sporadic case in his cousin, however a familial translocation cannot be excluded without further details of the affected individual.     Surya's maternal aunt was reported to have intellectual disability secondary to an event at birth.     Otherwise, the reported family history is negative for multiple miscarriages, stillbirths, birth defects, intellectual disability, known genetic conditions, and consanguinity.       Carrier Screening:   The patient reports that she and the father of the pregnancy have  ancestry:     Cystic fibrosis is an autosomal recessive genetic condition that occurs with increased frequency in individuals of  ancestry and carrier screening for this condition is available.  In addition,  screening in the [facility] includes cystic fibrosis.    The patient reports that she is of Mediterranean ancestry:     The hemoglobinopathies are a group of genetic blood diseases that occur with increased frequency in individuals of Mediterranean ancestry and carrier screening for these conditions is available.  Carrier screening for the hemoglobinopathies includes a CBC with red blood cell indices, a ferritin level, and a quantitative hemoglobin electrophoresis or HPLC.  In addition,  screening in the [facility] includes many of the hemoglobinopathies.      Expanded carrier screening for mutations in a large panel of genes associated with autosomal recessive conditions including cystic fibrosis, spinal muscular atrophy, and others, is now available.      The patient was not certain about whether to pursue carrier screening today.  She was provided with a brochure about her testing options, and contact information if she has questions or wishes to pursue screening.       Risk Assessment for Chromosome Conditions:   We explained that the risk for fetal chromosome abnormalities  increases with maternal age. We discussed specific features of common chromosome abnormalities, including Down syndrome, trisomy 13, trisomy 18, and sex chromosome trisomies.      - At age 31 at midtrimester, the risk to have a baby with Down syndrome is 1 in 508.     - At age 31 at midtrimester, the risk to have a baby with any chromosome abnormality is 1 in 254.        Testing Options:   We discussed the following options:   First trimester screening    First trimester ultrasound with nuchal translucency and nasal bone assessments, maternal plasma hCG, SUMA-A, and AFP measurement    Screens for fetal trisomy 21, trisomy 13, and trisomy 18    Cannot screen for open neural tube defects; maternal serum AFP after 15 weeks is recommended     Non-invasive Prenatal Testing (NIPT)    Maternal plasma cell-free DNA testing; first trimester ultrasound with nuchal translucency and nasal bone assessment is recommended, when appropriate    Screens for fetal trisomy 21, trisomy 13, trisomy 18, and sex chromosome aneuploidy    Cannot screen for open neural tube defects; maternal serum AFP after 15 weeks is recommended     Chorionic villus sampling (CVS)    Invasive procedure typically performed in the first trimester by which placental villi are obtained for the purpose of chromosome analysis and/or other prenatal genetic analysis    Diagnostic results; >99% sensitivity for fetal chromosome abnormalities    Cannot test for open neural tube defects; maternal serum AFP after 15 weeks is recommended     Genetic Amniocentesis    Invasive procedure typically performed in the second trimester by which amniotic fluid is obtained for the purpose of chromosome analysis and/or other prenatal genetic analysis    Diagnostic results; >99% sensitivity for fetal chromosome abnormalities    AFAFP measurement tests for open neural tube defects     Comprehensive (Level II) ultrasound: Detailed ultrasound performed between 18-22 weeks gestation to  screen for major birth defects and markers for aneuploidy.      We reviewed the benefits and limitations of this testing.  Screening tests provide a risk assessment specific to the pregnancy for certain fetal chromosome abnormalities, but cannot definitively diagnose or exclude a fetal chromosome abnormality.  Follow-up genetic counseling and consideration of diagnostic testing is recommended with any abnormal screening result.     Diagnostic tests carry inherent risks- including risk of miscarriage- that require careful consideration.  These tests can detect fetal chromosome abnormalities with greater than 99% certainty.  Results can be compromised by maternal cell contamination or mosaicism, and are limited by the resolution of cytogenetic G-banding technology.  There is no screening nor diagnostic test that can detect all forms of birth defects or mental disability.     It was a pleasure to be involved with Soraida manning care. Face-to-face time of the meeting was 35 minutes.    Solange Poe MS, [facility]  Licensed Genetic Counselor  [facility]  Maternal Fetal Medicine  Ph: [phone]  [email or web address]

## 2021-10-19 LAB
BACTERIA UR CULT: NO GROWTH
HBV SURFACE AG SERPL QL IA: NONREACTIVE
HCV AB SERPL QL IA: NONREACTIVE
HIV 1+2 AB+HIV1 P24 AG SERPL QL IA: NONREACTIVE

## 2021-10-22 ENCOUNTER — TELEPHONE (OUTPATIENT)
Dept: MATERNAL FETAL MEDICINE | Facility: CLINIC | Age: 31
End: 2021-10-22

## 2021-10-22 NOTE — TELEPHONE ENCOUNTER
Soraida called requesting the RQ number for her [employer] test so she could set up her portal to elect patient pay. Asked thata voicemail be left with the information. Called and left voicemail with [phone].     Delia Sarmiento MS, [facility]  Licensed Genetic Counselor   [facility]  Maternal Fetal Medicine  [email or web address]  Office: [phone]  Pager [phone]  MFM: [phone]   Fax: [phone]

## 2021-10-25 ENCOUNTER — TELEPHONE (OUTPATIENT)
Dept: MATERNAL FETAL MEDICINE | Facility: CLINIC | Age: 31
End: 2021-10-25

## 2021-10-25 LAB — SCANNED LAB RESULT: NORMAL

## 2021-11-10 ENCOUNTER — OFFICE VISIT (OUTPATIENT)
Dept: OBGYN | Facility: CLINIC | Age: 31
End: 2021-11-10
Attending: OBSTETRICS & GYNECOLOGY
Payer: COMMERCIAL

## 2021-11-10 VITALS
DIASTOLIC BLOOD PRESSURE: 82 MMHG | HEART RATE: 80 BPM | BODY MASS INDEX: 23.4 KG/M2 | WEIGHT: 145 LBS | SYSTOLIC BLOOD PRESSURE: 129 MMHG

## 2021-11-10 DIAGNOSIS — Z34.01 NORMAL FIRST PREGNANCY CONFIRMED, CURRENTLY IN FIRST TRIMESTER: ICD-10-CM

## 2021-11-10 DIAGNOSIS — Z11.3 ROUTINE SCREENING FOR STI (SEXUALLY TRANSMITTED INFECTION): Primary | ICD-10-CM

## 2021-11-10 DIAGNOSIS — Z34.02 ENCOUNTER FOR SUPERVISION OF NORMAL FIRST PREGNANCY IN SECOND TRIMESTER: ICD-10-CM

## 2021-11-10 PROCEDURE — 99207 PR PRENATAL VISIT: CPT | Performed by: OBSTETRICS & GYNECOLOGY

## 2021-11-10 PROCEDURE — G0463 HOSPITAL OUTPT CLINIC VISIT: HCPCS

## 2021-11-10 PROCEDURE — 87591 N.GONORRHOEAE DNA AMP PROB: CPT | Performed by: OBSTETRICS & GYNECOLOGY

## 2021-11-10 PROCEDURE — 87491 CHLMYD TRACH DNA AMP PROBE: CPT | Performed by: OBSTETRICS & GYNECOLOGY

## 2021-11-10 ASSESSMENT — PAIN SCALES - GENERAL: PAINLEVEL: NO PAIN (0)

## 2021-11-10 NOTE — LETTER
11/10/2021       RE: Soraida Meeks  [address] [id number]     Dear Colleague,    Thank you for referring your patient, Soraida Meeks, to the [facility] [place] at [facility]. Please see a copy of my visit note below.    SUBJECTIVE   Feeling overall pretty good, nocturia but denies n/v  No FM yet, no further bleeding  denies ctx/LOF/vb/preE sx    OBJECTIVE   /82   Pulse 80   Wt 65.8 kg (145 lb)   LMP 2021   Breastfeeding No   BMI 23.40 kg/m      See Ob Flowsheet    ASSESSMENT & PLAN   31 year old  at 15w0d, FIONA.    Patient Active Problem List    Diagnosis Date Noted     Supervision of normal first pregnancy 10/20/2021     Priority: High     ARDHA DEL TORO pt    Partner's name: Surya    [x] NOB folder  [x] Dating: changed by 7 week ultrasound (long cycles)  [x] Rh: pos  [x] Rubella immune  [x] Hep B immune  [x] Pap: NILM 2019, plan PP    [x] First tri screen: wnl  [x] cffDNA: wnl  [ ] AFP ordered today  [ ] Fetal anatomy US:  _____________________________________  [ ] EOB folder  [ ] PP Contraception plan:  [ ] Labor plans:  [ ] Infant feeding plan:  [ ] Influenza vaccine  [ ] COVID vaccine  [ ] TDAP vaccine  [ ] GCT:  ________________________________________  [ ] GBS:  [ ] OTC PP meds sent         RTC 4 weeks    Fay Alatorre MD MPH

## 2021-11-10 NOTE — NURSING NOTE
Chief Complaint   Patient presents with     Prenatal Care     FIONA 15 weeks and 5 days   Lupe Marroquin LPN

## 2021-11-10 NOTE — PROGRESS NOTES
SUBJECTIVE   Feeling overall pretty good, nocturia but denies n/v  No FM yet, no further bleeding  denies ctx/LOF/vb/preE sx    OBJECTIVE   /82   Pulse 80   Wt 65.8 kg (145 lb)   LMP 2021   Breastfeeding No   BMI 23.40 kg/m      See Ob Flowsheet    ASSESSMENT & PLAN   31 year old  at 15w0d, FIONA.    Patient Active Problem List    Diagnosis Date Noted     Supervision of normal first pregnancy 10/20/2021     Priority: High     [facility] MD pt    Partner's name: Surya    [x] NOB folder  [x] Dating: changed by 7 week ultrasound (long cycles)  [x] Rh: pos  [x] Rubella immune  [x] Hep B immune  [x] Pap: NILM 2019, plan PP    [x] First tri screen: wnl  [x] cffDNA: wnl  [ ] AFP ordered today  [ ] Fetal anatomy US:  _____________________________________  [ ] EOB folder  [ ] PP Contraception plan:  [ ] Labor plans:  [ ] Infant feeding plan:  [ ] Influenza vaccine  [ ] COVID vaccine  [ ] TDAP vaccine  [ ] GCT:  ________________________________________  [ ] GBS:  [ ] OTC PP meds sent         RTC 4 weeks    Fay Alatorre MD MPH

## 2021-11-11 LAB
C TRACH DNA SPEC QL NAA+PROBE: NEGATIVE
N GONORRHOEA DNA SPEC QL NAA+PROBE: NEGATIVE

## 2021-11-30 ENCOUNTER — LAB (OUTPATIENT)
Dept: LAB | Facility: CLINIC | Age: 31
End: 2021-11-30
Payer: COMMERCIAL

## 2021-11-30 DIAGNOSIS — Z34.01 NORMAL FIRST PREGNANCY CONFIRMED, CURRENTLY IN FIRST TRIMESTER: ICD-10-CM

## 2021-11-30 PROCEDURE — 82105 ALPHA-FETOPROTEIN SERUM: CPT | Mod: 90 | Performed by: PATHOLOGY

## 2021-11-30 PROCEDURE — 99000 SPECIMEN HANDLING OFFICE-LAB: CPT | Performed by: PATHOLOGY

## 2021-11-30 PROCEDURE — 36415 COLL VENOUS BLD VENIPUNCTURE: CPT | Performed by: PATHOLOGY

## 2021-12-02 LAB
# FETUSES US: NORMAL
AFP MOM SERPL: 0.9
AFP SERPL-MCNC: 45 NG/ML
AGE - REPORTED: 32.8 YR
CURRENT SMOKER: NO
FAMILY MEMBER DISEASES HX: NO
GA METHOD: NORMAL
GA: NORMAL WK
IDDM PATIENT QL: NO
INTEGRATED SCN PATIENT-IMP: NORMAL
SPECIMEN DRAWN SERPL: NORMAL

## 2021-12-07 ENCOUNTER — HOSPITAL ENCOUNTER (OUTPATIENT)
Dept: ULTRASOUND IMAGING | Facility: CLINIC | Age: 31
End: 2021-12-07
Attending: OBSTETRICS & GYNECOLOGY
Payer: COMMERCIAL

## 2021-12-07 ENCOUNTER — OFFICE VISIT (OUTPATIENT)
Dept: MATERNAL FETAL MEDICINE | Facility: CLINIC | Age: 31
End: 2021-12-07
Attending: OBSTETRICS & GYNECOLOGY
Payer: COMMERCIAL

## 2021-12-07 DIAGNOSIS — O26.90 PREGNANCY RELATED CONDITION, ANTEPARTUM: Primary | ICD-10-CM

## 2021-12-07 DIAGNOSIS — Z36.9 ENCOUNTER FOR ANTENATAL SCREENING: ICD-10-CM

## 2021-12-07 PROCEDURE — 76811 OB US DETAILED SNGL FETUS: CPT

## 2021-12-07 PROCEDURE — 76811 OB US DETAILED SNGL FETUS: CPT | Mod: 26 | Performed by: OBSTETRICS & GYNECOLOGY

## 2021-12-07 NOTE — PROGRESS NOTES
The patient was seen for an ultrasound in the Maternal-Fetal Medicine Center at the [facility] today.  For a detailed report of the ultrasound examination, please see the ultrasound report which can be found under the imaging tab.    Nadia Montes MD  , OB/GYN  Maternal-Fetal Medicine  [phone] (Pager)

## 2022-01-30 ENCOUNTER — HEALTH MAINTENANCE LETTER (OUTPATIENT)
Age: 32
End: 2022-01-30

## 2022-01-30 DIAGNOSIS — Z34.02 ENCOUNTER FOR SUPERVISION OF NORMAL FIRST PREGNANCY IN SECOND TRIMESTER: Primary | ICD-10-CM

## 2022-01-30 PROBLEM — Z34.00 SUPERVISION OF NORMAL FIRST PREGNANCY: Status: ACTIVE | Noted: 2021-10-20

## 2022-02-02 ENCOUNTER — OFFICE VISIT (OUTPATIENT)
Dept: OBGYN | Facility: CLINIC | Age: 32
End: 2022-02-02
Attending: OBSTETRICS & GYNECOLOGY
Payer: COMMERCIAL

## 2022-02-02 ENCOUNTER — LAB (OUTPATIENT)
Dept: LAB | Facility: CLINIC | Age: 32
End: 2022-02-02
Attending: OBSTETRICS & GYNECOLOGY
Payer: COMMERCIAL

## 2022-02-02 VITALS
BODY MASS INDEX: 24.88 KG/M2 | HEIGHT: 66 IN | SYSTOLIC BLOOD PRESSURE: 125 MMHG | HEART RATE: 80 BPM | WEIGHT: 154.8 LBS | DIASTOLIC BLOOD PRESSURE: 80 MMHG

## 2022-02-02 DIAGNOSIS — Z34.02 ENCOUNTER FOR SUPERVISION OF NORMAL FIRST PREGNANCY IN SECOND TRIMESTER: ICD-10-CM

## 2022-02-02 DIAGNOSIS — R73.09 ELEVATED GLUCOSE TOLERANCE TEST: ICD-10-CM

## 2022-02-02 DIAGNOSIS — R73.09 ELEVATED GLUCOSE LEVEL: ICD-10-CM

## 2022-02-02 DIAGNOSIS — Z34.02 ENCOUNTER FOR SUPERVISION OF NORMAL FIRST PREGNANCY IN SECOND TRIMESTER: Primary | ICD-10-CM

## 2022-02-02 LAB
DEPRECATED CALCIDIOL+CALCIFEROL SERPL-MC: 38 UG/L (ref 20–75)
ERYTHROCYTE [DISTWIDTH] IN BLOOD BY AUTOMATED COUNT: 12.9 % (ref 10–15)
GLUCOSE 1H P 50 G GLC PO SERPL-MCNC: 144 MG/DL (ref 70–129)
HCT VFR BLD AUTO: 36 % (ref 35–47)
HGB BLD-MCNC: 12.6 G/DL (ref 11.7–15.7)
MCH RBC QN AUTO: 32.6 PG (ref 26.5–33)
MCHC RBC AUTO-ENTMCNC: 35 G/DL (ref 31.5–36.5)
MCV RBC AUTO: 93 FL (ref 78–100)
PLATELET # BLD AUTO: 233 10E3/UL (ref 150–450)
RBC # BLD AUTO: 3.87 10E6/UL (ref 3.8–5.2)
WBC # BLD AUTO: 10.6 10E3/UL (ref 4–11)

## 2022-02-02 PROCEDURE — 82306 VITAMIN D 25 HYDROXY: CPT

## 2022-02-02 PROCEDURE — 90715 TDAP VACCINE 7 YRS/> IM: CPT

## 2022-02-02 PROCEDURE — 85027 COMPLETE CBC AUTOMATED: CPT

## 2022-02-02 PROCEDURE — 82950 GLUCOSE TEST: CPT

## 2022-02-02 PROCEDURE — 36415 COLL VENOUS BLD VENIPUNCTURE: CPT

## 2022-02-02 PROCEDURE — 250N000011 HC RX IP 250 OP 636

## 2022-02-02 PROCEDURE — 99207 PR PRENATAL VISIT: CPT | Performed by: OBSTETRICS & GYNECOLOGY

## 2022-02-02 PROCEDURE — G0463 HOSPITAL OUTPT CLINIC VISIT: HCPCS | Mod: 25

## 2022-02-02 PROCEDURE — 90471 IMMUNIZATION ADMIN: CPT

## 2022-02-02 PROCEDURE — 86780 TREPONEMA PALLIDUM: CPT

## 2022-02-02 ASSESSMENT — PAIN SCALES - GENERAL: PAINLEVEL: NO PAIN (0)

## 2022-02-02 ASSESSMENT — MIFFLIN-ST. JEOR: SCORE: 1428.92

## 2022-02-02 NOTE — PROGRESS NOTES
"SUBJECTIVE   Winter SGO felt more SOB with altitude  miralax daily  Overall, feeling pretty good  Good FM  denies ctx/LOF/vb/preE sx    OBJECTIVE   /80   Pulse 80   Ht 1.676 m (5' 6\")   Wt 70.2 kg (154 lb 12.8 oz)   LMP 2021   BMI 24.99 kg/m      See Ob Flowsheet    ASSESSMENT & PLAN   31 year old  at 27w0d, FIONA.    Patient Active Problem List    Diagnosis Date Noted     Supervision of normal first pregnancy 10/20/2021     Priority: High     [facility] MD pt    Partner's name: Surya    [x] NOB folder  [x] Dating: changed by 7 week ultrasound (long cycles)  [x] Rh: pos  [x] Rubella immune  [x] Hep B immune  [x] Pap: NILM 2019, plan PP    [x] First tri screen: wnl  [x] cffDNA: wnl  [x] AFP wnl  [x] Fetal anatomy US: wnl  _____________________________________  [x] EOB folder reviewed  [ ] PP Contraception plan:  [x] Labor plans: 39 v. 40 weeks IOL  [x] Infant feeding plan: BF  [x] Influenza vaccine  [x] COVID vaccine  [x] TDAP vaccine - 22  [x] GCT - 144  [ ] GTT  ________________________________________  [ ] GBS:  [ ] OTC PP meds sent         RTC 2 weeks    Fay Alatorre MD MPH        "

## 2022-02-02 NOTE — LETTER
Date:February 3, 2022      Provider requested that no letter be sent. Do not send.       [facility]

## 2022-02-02 NOTE — LETTER
"2022       RE: Soraida Meeks  [address] [id number]     Dear Colleague,    Thank you for referring your patient, Soraida Meeks, to the [facility] [place] at [facility]. Please see a copy of my visit note below.    SUBJECTIVE   Winter SGO felt more SOB with altitude  miralax daily  Overall, feeling pretty good  Good FM  denies ctx/LOF/vb/preE sx    OBJECTIVE   /80   Pulse 80   Ht 1.676 m (5' 6\")   Wt 70.2 kg (154 lb 12.8 oz)   LMP 2021   BMI 24.99 kg/m      See Ob Flowsheet    ASSESSMENT & PLAN   31 year old  at 27w0d, FIONA.    Patient Active Problem List    Diagnosis Date Noted     Supervision of normal first pregnancy 10/20/2021     Priority: High     [facility] MD pt    Partner's name: Surya    [x] NOB folder  [x] Dating: changed by 7 week ultrasound (long cycles)  [x] Rh: pos  [x] Rubella immune  [x] Hep B immune  [x] Pap: NILM 2019, plan PP    [x] First tri screen: wnl  [x] cffDNA: wnl  [x] AFP wnl  [x] Fetal anatomy US: wnl  _____________________________________  [x] EOB folder reviewed  [ ] PP Contraception plan:  [x] Labor plans: 39 v. 40 weeks IOL  [x] Infant feeding plan: BF  [x] Influenza vaccine  [x] COVID vaccine  [x] TDAP vaccine - 22  [x] GCT - 144  [ ] GTT  ________________________________________  [ ] GBS:  [ ] OTC PP meds sent         RTC 2 weeks    Fay Alatorre MD MPH            Again, thank you for allowing me to participate in the care of your patient.      Sincerely,    Eli Alatorre MD      "

## 2022-02-03 ENCOUNTER — LAB (OUTPATIENT)
Dept: LAB | Facility: CLINIC | Age: 32
End: 2022-02-03
Payer: COMMERCIAL

## 2022-02-03 DIAGNOSIS — R73.09 ELEVATED GLUCOSE LEVEL: ICD-10-CM

## 2022-02-03 LAB
GESTATIONAL GTT 1 HR POST DOSE: 176 MG/DL (ref 60–179)
GESTATIONAL GTT 2 HR POST DOSE: 178 MG/DL (ref 60–154)
GESTATIONAL GTT 3 HR POST DOSE: 80 MG/DL (ref 60–139)
GLUCOSE P FAST SERPL-MCNC: 80 MG/DL (ref 60–94)
T PALLIDUM AB SER QL: NONREACTIVE

## 2022-02-03 PROCEDURE — 82951 GLUCOSE TOLERANCE TEST (GTT): CPT

## 2022-02-03 PROCEDURE — 82947 ASSAY GLUCOSE BLOOD QUANT: CPT

## 2022-02-03 PROCEDURE — 82950 GLUCOSE TEST: CPT

## 2022-02-03 PROCEDURE — 36415 COLL VENOUS BLD VENIPUNCTURE: CPT

## 2022-02-09 ENCOUNTER — LAB (OUTPATIENT)
Dept: LAB | Facility: CLINIC | Age: 32
End: 2022-02-09
Payer: COMMERCIAL

## 2022-02-09 DIAGNOSIS — R39.15 URINARY URGENCY: Primary | ICD-10-CM

## 2022-02-09 DIAGNOSIS — R39.15 URINARY URGENCY: ICD-10-CM

## 2022-02-09 LAB
ALBUMIN UR-MCNC: NEGATIVE MG/DL
APPEARANCE UR: CLEAR
BILIRUB UR QL STRIP: NEGATIVE
COLOR UR AUTO: ABNORMAL
GLUCOSE UR STRIP-MCNC: NEGATIVE MG/DL
HGB UR QL STRIP: NEGATIVE
KETONES UR STRIP-MCNC: NEGATIVE MG/DL
LEUKOCYTE ESTERASE UR QL STRIP: NEGATIVE
MUCOUS THREADS #/AREA URNS LPF: PRESENT /LPF
NITRATE UR QL: NEGATIVE
PH UR STRIP: 6.5 [PH] (ref 5–7)
RBC URINE: 0 /HPF
SP GR UR STRIP: 1.01 (ref 1–1.03)
UROBILINOGEN UR STRIP-MCNC: NORMAL MG/DL
WBC URINE: 1 /HPF

## 2022-02-09 PROCEDURE — 81001 URINALYSIS AUTO W/SCOPE: CPT

## 2022-02-19 DIAGNOSIS — Z34.03 ENCOUNTER FOR SUPERVISION OF NORMAL FIRST PREGNANCY IN THIRD TRIMESTER: Primary | ICD-10-CM

## 2022-02-19 RX ORDER — ESCITALOPRAM OXALATE 5 MG/1
5 TABLET ORAL DAILY
Qty: 90 TABLET | Refills: 1 | Status: SHIPPED | OUTPATIENT
Start: 2022-02-19 | End: 2022-05-06

## 2022-03-23 ENCOUNTER — OFFICE VISIT (OUTPATIENT)
Dept: OBGYN | Facility: CLINIC | Age: 32
End: 2022-03-23
Attending: OBSTETRICS & GYNECOLOGY
Payer: COMMERCIAL

## 2022-03-23 VITALS
BODY MASS INDEX: 26.31 KG/M2 | SYSTOLIC BLOOD PRESSURE: 128 MMHG | WEIGHT: 163 LBS | HEART RATE: 88 BPM | DIASTOLIC BLOOD PRESSURE: 81 MMHG

## 2022-03-23 DIAGNOSIS — Z34.03 ENCOUNTER FOR SUPERVISION OF NORMAL FIRST PREGNANCY IN THIRD TRIMESTER: ICD-10-CM

## 2022-03-23 PROCEDURE — 99207 PR PRENATAL VISIT: CPT | Performed by: OBSTETRICS & GYNECOLOGY

## 2022-03-23 PROCEDURE — G0463 HOSPITAL OUTPT CLINIC VISIT: HCPCS

## 2022-03-23 ASSESSMENT — PAIN SCALES - GENERAL: PAINLEVEL: NO PAIN (0)

## 2022-03-23 NOTE — LETTER
3/23/2022       RE: Soraida Meeks  [address] [place], [place] [id number]     Dear Colleague,    Thank you for referring your patient, Soraida Meeks, to the [facility] [place] at [facility]. Please see a copy of my visit note below.    Chief Complaint   Patient presents with     Prenatal Care     FIONA 34 weeks   Lupe Marroquin , RENNY    SUBJECTIVE   Episode of GI illness over the weekend, had some cramps/ctx, Cvx 1 cm and long at that time, resovled now  Good FM  Denies LOF/vb/preE sx    OBJECTIVE   /81   Pulse 88   Wt 73.9 kg (163 lb)   LMP 2021   Breastfeeding No   BMI 26.31 kg/m      See Ob Flowsheet    ASSESSMENT & PLAN   31 year old  at 34w0d, FIONA.    Patient Active Problem List    Diagnosis Date Noted     Supervision of normal first pregnancy 10/20/2021     Priority: High     [facility] MD pt    Partner's name: Surya    [x] NOB folder  [x] Dating: changed by 7 week ultrasound (long cycles)  [x] Rh: pos  [x] Rubella immune  [x] Hep B immune  [x] Pap: NILM 2019, plan PP    [x] First tri screen: wnl  [x] cffDNA: wnl  [x] AFP wnl  [x] Fetal anatomy US: wnl  _____________________________________  [x] EOB folder reviewed  [ ] PP Contraception plan:  [x] Labor plans: 39 v. 40 weeks IOL  [x] Infant feeding plan: BF  [x] Influenza vaccine  [x] COVID vaccine  [x] TDAP vaccine - 22  [x] GCT - 144  [x] GTT - wnl ( values elevated)  ________________________________________  [ ] GBS:  [ ] OTC PP meds sent         RTC 2 weeks, GBS that visit    Fay Alatorre MD MPH              Again, thank you for allowing me to participate in the care of your patient.      Sincerely,    Eli Alatorre MD

## 2022-03-23 NOTE — PROGRESS NOTES
SUBJECTIVE   Episode of GI illness over the weekend, had some cramps/ctx, Cvx 1 cm and long at that time, resovled now  Good FM  Denies LOF/vb/preE sx    OBJECTIVE   /81   Pulse 88   Wt 73.9 kg (163 lb)   LMP 2021   Breastfeeding No   BMI 26.31 kg/m      See Ob Flowsheet    ASSESSMENT & PLAN   31 year old  at 34w0d, FIONA.    Patient Active Problem List    Diagnosis Date Noted     Supervision of normal first pregnancy 10/20/2021     Priority: High     RADHA DEL TORO pt    Partner's name: Surya    [x] NOB folder  [x] Dating: changed by 7 week ultrasound (long cycles)  [x] Rh: pos  [x] Rubella immune  [x] Hep B immune  [x] Pap: NILM , plan PP    [x] First tri screen: wnl  [x] cffDNA: wnl  [x] AFP wnl  [x] Fetal anatomy US: wnl  _____________________________________  [x] EOB folder reviewed  [ ] PP Contraception plan:  [x] Labor plans: 39 v. 40 weeks IOL  [x] Infant feeding plan: BF  [x] Influenza vaccine  [x] COVID vaccine  [x] TDAP vaccine - 22  [x] GCT - 144  [x] GTT - wnl ( values elevated)  ________________________________________  [ ] GBS:  [ ] OTC PP meds sent         RTC 2 weeks, GBS that visit    Fay Alatorre MD MPH

## 2022-03-23 NOTE — LETTER
Date:March 24, 2022      Provider requested that no letter be sent. Do not send.       [facility]

## 2022-04-06 ENCOUNTER — OFFICE VISIT (OUTPATIENT)
Dept: OBGYN | Facility: CLINIC | Age: 32
End: 2022-04-06
Attending: OBSTETRICS & GYNECOLOGY
Payer: COMMERCIAL

## 2022-04-06 VITALS
HEART RATE: 84 BPM | SYSTOLIC BLOOD PRESSURE: 119 MMHG | DIASTOLIC BLOOD PRESSURE: 79 MMHG | BODY MASS INDEX: 26.47 KG/M2 | WEIGHT: 164 LBS

## 2022-04-06 DIAGNOSIS — Z34.03 ENCOUNTER FOR SUPERVISION OF NORMAL FIRST PREGNANCY IN THIRD TRIMESTER: ICD-10-CM

## 2022-04-06 DIAGNOSIS — Z34.03 PREGNANCY, SUPERVISION OF FIRST, THIRD TRIMESTER: Primary | ICD-10-CM

## 2022-04-06 PROCEDURE — 99207 PR PRENATAL VISIT: CPT | Performed by: OBSTETRICS & GYNECOLOGY

## 2022-04-06 PROCEDURE — 87653 STREP B DNA AMP PROBE: CPT | Performed by: OBSTETRICS & GYNECOLOGY

## 2022-04-06 PROCEDURE — G0463 HOSPITAL OUTPT CLINIC VISIT: HCPCS

## 2022-04-06 ASSESSMENT — PAIN SCALES - GENERAL: PAINLEVEL: NO PAIN (0)

## 2022-04-06 NOTE — LETTER
2022       RE: Soraida Meeks  [address] [id number]     Dear Colleague,    Thank you for referring your patient, Soraida Meeks, to the [facility]OMEN[facility] CL[facility] [place] at [facility]. Please see a copy of my visit note below.    Chief Complaint   Patient presents with     Prenatal Care     FIONA 36 weeks   Lupe Marroquin , RENNY    SUBJECTIVE   More pelvic girdle pressure, B-H but no painful ctx  Good FM  denies ctx/LOF/vb/preE sx    OBJECTIVE   /79   Pulse 84   Wt 74.4 kg (164 lb)   LMP 2021   Breastfeeding No   BMI 26.47 kg/m      See Ob Flowsheet  Cephalic by TAUS    ASSESSMENT & PLAN   31 year old  at 36w0d, FIONA.    Patient Active Problem List    Diagnosis Date Noted     Supervision of normal first pregnancy 10/20/2021     Priority: High     [facility] MD pt    Partner's name: Surya    [x] NOB folder  [x] Dating: changed by 7 week ultrasound (long cycles)  [x] Rh: pos  [x] Rubella immune  [x] Hep B immune  [x] Pap: NILM , plan PP    [x] First tri screen: wnl  [x] cffDNA: wnl  [x] AFP wnl  [x] Fetal anatomy US: wnl  _____________________________________  [x] EOB folder reviewed  [ ] PP Contraception plan:  [x] Labor plans: 39 v. 40 weeks IOL, thinking IOL week of May 2-  [x] Infant feeding plan: BF  [x] Influenza vaccine  [x] COVID vaccine  [x] TDAP vaccine - 22  [x] GCT - 144  [x] GTT - wnl ( values elevated)  ________________________________________  [x] GBS 22  [x] OTC meds          Plan IOL 39-40w    Fay Alatorre MD MPH              Again, thank you for allowing me to participate in the care of your patient.      Sincerely,    Eli Alatorre MD

## 2022-04-06 NOTE — PROGRESS NOTES
SUBJECTIVE   More pelvic girdle pressure, B-H but no painful ctx  Good FM  denies ctx/LOF/vb/preE sx    OBJECTIVE   /79   Pulse 84   Wt 74.4 kg (164 lb)   LMP 2021   Breastfeeding No   BMI 26.47 kg/m      See Ob Flowsheet  Cephalic by AI    ASSESSMENT & PLAN   31 year old  at 36w0d, FIONA.    Patient Active Problem List    Diagnosis Date Noted     Supervision of normal first pregnancy 10/20/2021     Priority: High     [facility] MD pt    Partner's name: Surya    [x] NOB folder  [x] Dating: changed by 7 week ultrasound (long cycles)  [x] Rh: pos  [x] Rubella immune  [x] Hep B immune  [x] Pap: NILM 2019, plan PP    [x] First tri screen: wnl  [x] cffDNA: wnl  [x] AFP wnl  [x] Fetal anatomy US: wnl  _____________________________________  [x] EOB folder reviewed  [ ] PP Contraception plan:  [x] Labor plans: 39 v. 40 weeks IOL, thinking IOL week of May 2-  [x] Infant feeding plan: BF  [x] Influenza vaccine  [x] COVID vaccine  [x] TDAP vaccine - 22  [x] GCT - 144  [x] GTT - wnl ( values elevated)  ________________________________________  [x] GBS 22  [x] OTC meds          Plan IOL 39-40w    Fay Alatorre MD MPH

## 2022-04-06 NOTE — LETTER
Date:April 7, 2022      Provider requested that no letter be sent. Do not send.       [facility]

## 2022-04-07 LAB — GP B STREP DNA SPEC QL NAA+PROBE: NEGATIVE

## 2022-04-15 DIAGNOSIS — Z34.90 ENCOUNTER FOR INDUCTION OF LABOR: Primary | ICD-10-CM

## 2022-04-27 ENCOUNTER — HOSPITAL ENCOUNTER (INPATIENT)
Facility: CLINIC | Age: 32
LOS: 2 days | Discharge: HOME | End: 2022-04-29
Attending: OBSTETRICS & GYNECOLOGY | Admitting: OBSTETRICS & GYNECOLOGY
Payer: COMMERCIAL

## 2022-04-27 ENCOUNTER — OFFICE VISIT (OUTPATIENT)
Dept: OBGYN | Facility: CLINIC | Age: 32
End: 2022-04-27
Attending: OBSTETRICS & GYNECOLOGY
Payer: COMMERCIAL

## 2022-04-27 ENCOUNTER — APPOINTMENT (OUTPATIENT)
Dept: LAB | Facility: CLINIC | Age: 32
End: 2022-04-27
Attending: OBSTETRICS & GYNECOLOGY
Payer: COMMERCIAL

## 2022-04-27 VITALS
SYSTOLIC BLOOD PRESSURE: 152 MMHG | BODY MASS INDEX: 27.24 KG/M2 | HEART RATE: 69 BPM | HEIGHT: 66 IN | WEIGHT: 169.5 LBS | DIASTOLIC BLOOD PRESSURE: 100 MMHG

## 2022-04-27 DIAGNOSIS — R03.0 ELEVATED BP WITHOUT DIAGNOSIS OF HYPERTENSION: Primary | ICD-10-CM

## 2022-04-27 PROBLEM — O13.9 GESTATIONAL HYPERTENSION: Status: ACTIVE | Noted: 2022-04-27

## 2022-04-27 LAB
ABO/RH(D): NORMAL
ALT SERPL W P-5'-P-CCNC: 29 U/L (ref 0–50)
ANION GAP SERPL CALCULATED.3IONS-SCNC: 11 MMOL/L (ref 3–14)
ANTIBODY SCREEN: NEGATIVE
AST SERPL W P-5'-P-CCNC: 21 U/L (ref 0–45)
BUN SERPL-MCNC: 11 MG/DL (ref 7–30)
CALCIUM SERPL-MCNC: 8.7 MG/DL (ref 8.5–10.1)
CHLORIDE BLD-SCNC: 104 MMOL/L (ref 94–109)
CO2 SERPL-SCNC: 21 MMOL/L (ref 20–32)
CREAT SERPL-MCNC: 0.63 MG/DL (ref 0.52–1.04)
CREAT UR-MCNC: 71 MG/DL
ERYTHROCYTE [DISTWIDTH] IN BLOOD BY AUTOMATED COUNT: 12.7 % (ref 10–15)
GFR SERPL CREATININE-BSD FRML MDRD: >90 ML/MIN/1.73M2
GLUCOSE BLD-MCNC: 83 MG/DL (ref 70–99)
HCT VFR BLD AUTO: 35.5 % (ref 35–47)
HGB BLD-MCNC: 12.3 G/DL (ref 11.7–15.7)
MCH RBC QN AUTO: 31.9 PG (ref 26.5–33)
MCHC RBC AUTO-ENTMCNC: 34.6 G/DL (ref 31.5–36.5)
MCV RBC AUTO: 92 FL (ref 78–100)
PLATELET # BLD AUTO: 210 10E3/UL (ref 150–450)
POTASSIUM BLD-SCNC: 3.7 MMOL/L (ref 3.4–5.3)
PROT UR-MCNC: 0.13 G/L
PROT/CREAT 24H UR: 0.18 G/G CR (ref 0–0.2)
RBC # BLD AUTO: 3.85 10E6/UL (ref 3.8–5.2)
SARS-COV-2 RNA RESP QL NAA+PROBE: NEGATIVE
SODIUM SERPL-SCNC: 136 MMOL/L (ref 133–144)
SPECIMEN EXPIRATION DATE: NORMAL
WBC # BLD AUTO: 9.8 10E3/UL (ref 4–11)

## 2022-04-27 PROCEDURE — 84450 TRANSFERASE (AST) (SGOT): CPT | Performed by: OBSTETRICS & GYNECOLOGY

## 2022-04-27 PROCEDURE — 120N000002 HC R&B MED SURG/OB UMMC

## 2022-04-27 PROCEDURE — G0463 HOSPITAL OUTPT CLINIC VISIT: HCPCS

## 2022-04-27 PROCEDURE — 10907ZC DRAINAGE OF AMNIOTIC FLUID, THERAPEUTIC FROM PRODUCTS OF CONCEPTION, VIA NATURAL OR ARTIFICIAL OPENING: ICD-10-PCS | Performed by: OBSTETRICS & GYNECOLOGY

## 2022-04-27 PROCEDURE — 250N000009 HC RX 250: Performed by: STUDENT IN AN ORGANIZED HEALTH CARE EDUCATION/TRAINING PROGRAM

## 2022-04-27 PROCEDURE — 59425 ANTEPARTUM CARE ONLY: CPT | Performed by: OBSTETRICS & GYNECOLOGY

## 2022-04-27 PROCEDURE — 36415 COLL VENOUS BLD VENIPUNCTURE: CPT | Performed by: OBSTETRICS & GYNECOLOGY

## 2022-04-27 PROCEDURE — 84460 ALANINE AMINO (ALT) (SGPT): CPT | Performed by: OBSTETRICS & GYNECOLOGY

## 2022-04-27 PROCEDURE — 86901 BLOOD TYPING SEROLOGIC RH(D): CPT | Performed by: STUDENT IN AN ORGANIZED HEALTH CARE EDUCATION/TRAINING PROGRAM

## 2022-04-27 PROCEDURE — 84156 ASSAY OF PROTEIN URINE: CPT | Performed by: OBSTETRICS & GYNECOLOGY

## 2022-04-27 PROCEDURE — 258N000003 HC RX IP 258 OP 636: Performed by: STUDENT IN AN ORGANIZED HEALTH CARE EDUCATION/TRAINING PROGRAM

## 2022-04-27 PROCEDURE — 87635 SARS-COV-2 COVID-19 AMP PRB: CPT | Performed by: STUDENT IN AN ORGANIZED HEALTH CARE EDUCATION/TRAINING PROGRAM

## 2022-04-27 PROCEDURE — 86780 TREPONEMA PALLIDUM: CPT | Performed by: STUDENT IN AN ORGANIZED HEALTH CARE EDUCATION/TRAINING PROGRAM

## 2022-04-27 PROCEDURE — 80048 BASIC METABOLIC PNL TOTAL CA: CPT | Performed by: OBSTETRICS & GYNECOLOGY

## 2022-04-27 PROCEDURE — 85027 COMPLETE CBC AUTOMATED: CPT | Performed by: OBSTETRICS & GYNECOLOGY

## 2022-04-27 RX ORDER — MISOPROSTOL 200 UG/1
400 TABLET ORAL
Status: DISCONTINUED | OUTPATIENT
Start: 2022-04-27 | End: 2022-04-28 | Stop reason: HOSPADM

## 2022-04-27 RX ORDER — LIDOCAINE 40 MG/G
CREAM TOPICAL
Status: DISCONTINUED | OUTPATIENT
Start: 2022-04-27 | End: 2022-04-28 | Stop reason: HOSPADM

## 2022-04-27 RX ORDER — OXYTOCIN 10 [USP'U]/ML
10 INJECTION, SOLUTION INTRAMUSCULAR; INTRAVENOUS
Status: DISCONTINUED | OUTPATIENT
Start: 2022-04-27 | End: 2022-04-28 | Stop reason: HOSPADM

## 2022-04-27 RX ORDER — TRANEXAMIC ACID 10 MG/ML
1 INJECTION, SOLUTION INTRAVENOUS EVERY 30 MIN PRN
Status: DISCONTINUED | OUTPATIENT
Start: 2022-04-27 | End: 2022-04-28 | Stop reason: HOSPADM

## 2022-04-27 RX ORDER — OXYTOCIN 10 [USP'U]/ML
10 INJECTION, SOLUTION INTRAMUSCULAR; INTRAVENOUS
Status: DISCONTINUED | OUTPATIENT
Start: 2022-04-27 | End: 2022-04-28

## 2022-04-27 RX ORDER — METHYLERGONOVINE MALEATE 0.2 MG/ML
200 INJECTION INTRAVENOUS
Status: DISCONTINUED | OUTPATIENT
Start: 2022-04-27 | End: 2022-04-28 | Stop reason: HOSPADM

## 2022-04-27 RX ORDER — METOCLOPRAMIDE HYDROCHLORIDE 5 MG/ML
10 INJECTION INTRAMUSCULAR; INTRAVENOUS EVERY 6 HOURS PRN
Status: DISCONTINUED | OUTPATIENT
Start: 2022-04-27 | End: 2022-04-28 | Stop reason: HOSPADM

## 2022-04-27 RX ORDER — TERBUTALINE SULFATE 1 MG/ML
0.25 INJECTION, SOLUTION SUBCUTANEOUS
Status: DISCONTINUED | OUTPATIENT
Start: 2022-04-27 | End: 2022-04-28 | Stop reason: HOSPADM

## 2022-04-27 RX ORDER — CITRIC ACID/SODIUM CITRATE 334-500MG
30 SOLUTION, ORAL ORAL
Status: DISCONTINUED | OUTPATIENT
Start: 2022-04-27 | End: 2022-04-28 | Stop reason: HOSPADM

## 2022-04-27 RX ORDER — OXYTOCIN/0.9 % SODIUM CHLORIDE 30/500 ML
1-24 PLASTIC BAG, INJECTION (ML) INTRAVENOUS CONTINUOUS
Status: DISCONTINUED | OUTPATIENT
Start: 2022-04-27 | End: 2022-04-28 | Stop reason: HOSPADM

## 2022-04-27 RX ORDER — NALOXONE HYDROCHLORIDE 0.4 MG/ML
0.4 INJECTION, SOLUTION INTRAMUSCULAR; INTRAVENOUS; SUBCUTANEOUS
Status: DISCONTINUED | OUTPATIENT
Start: 2022-04-27 | End: 2022-04-28 | Stop reason: HOSPADM

## 2022-04-27 RX ORDER — OXYTOCIN/0.9 % SODIUM CHLORIDE 30/500 ML
100-340 PLASTIC BAG, INJECTION (ML) INTRAVENOUS CONTINUOUS PRN
Status: DISCONTINUED | OUTPATIENT
Start: 2022-04-27 | End: 2022-04-28

## 2022-04-27 RX ORDER — NALOXONE HYDROCHLORIDE 0.4 MG/ML
0.2 INJECTION, SOLUTION INTRAMUSCULAR; INTRAVENOUS; SUBCUTANEOUS
Status: DISCONTINUED | OUTPATIENT
Start: 2022-04-27 | End: 2022-04-28 | Stop reason: HOSPADM

## 2022-04-27 RX ORDER — METOCLOPRAMIDE 10 MG/1
10 TABLET ORAL EVERY 6 HOURS PRN
Status: DISCONTINUED | OUTPATIENT
Start: 2022-04-27 | End: 2022-04-28 | Stop reason: HOSPADM

## 2022-04-27 RX ORDER — ACETAMINOPHEN 325 MG/1
650 TABLET ORAL EVERY 4 HOURS PRN
Status: DISCONTINUED | OUTPATIENT
Start: 2022-04-27 | End: 2022-04-28 | Stop reason: HOSPADM

## 2022-04-27 RX ORDER — IBUPROFEN 800 MG/1
800 TABLET, FILM COATED ORAL
Status: DISCONTINUED | OUTPATIENT
Start: 2022-04-27 | End: 2022-04-28

## 2022-04-27 RX ORDER — OXYTOCIN/0.9 % SODIUM CHLORIDE 30/500 ML
340 PLASTIC BAG, INJECTION (ML) INTRAVENOUS CONTINUOUS PRN
Status: DISCONTINUED | OUTPATIENT
Start: 2022-04-27 | End: 2022-04-28 | Stop reason: HOSPADM

## 2022-04-27 RX ORDER — KETOROLAC TROMETHAMINE 30 MG/ML
30 INJECTION, SOLUTION INTRAMUSCULAR; INTRAVENOUS
Status: DISCONTINUED | OUTPATIENT
Start: 2022-04-27 | End: 2022-04-28

## 2022-04-27 RX ORDER — ONDANSETRON 4 MG/1
4 TABLET, ORALLY DISINTEGRATING ORAL EVERY 6 HOURS PRN
Status: DISCONTINUED | OUTPATIENT
Start: 2022-04-27 | End: 2022-04-28 | Stop reason: HOSPADM

## 2022-04-27 RX ORDER — MISOPROSTOL 200 UG/1
800 TABLET ORAL
Status: DISCONTINUED | OUTPATIENT
Start: 2022-04-27 | End: 2022-04-28 | Stop reason: HOSPADM

## 2022-04-27 RX ORDER — CARBOPROST TROMETHAMINE 250 UG/ML
250 INJECTION, SOLUTION INTRAMUSCULAR
Status: DISCONTINUED | OUTPATIENT
Start: 2022-04-27 | End: 2022-04-28 | Stop reason: HOSPADM

## 2022-04-27 RX ORDER — ONDANSETRON 2 MG/ML
4 INJECTION INTRAMUSCULAR; INTRAVENOUS EVERY 6 HOURS PRN
Status: DISCONTINUED | OUTPATIENT
Start: 2022-04-27 | End: 2022-04-28 | Stop reason: HOSPADM

## 2022-04-27 RX ORDER — SODIUM CHLORIDE, SODIUM LACTATE, POTASSIUM CHLORIDE, CALCIUM CHLORIDE 600; 310; 30; 20 MG/100ML; MG/100ML; MG/100ML; MG/100ML
INJECTION, SOLUTION INTRAVENOUS CONTINUOUS
Status: DISCONTINUED | OUTPATIENT
Start: 2022-04-27 | End: 2022-04-28 | Stop reason: HOSPADM

## 2022-04-27 RX ORDER — ESCITALOPRAM OXALATE 5 MG/1
5 TABLET ORAL DAILY
Status: DISCONTINUED | OUTPATIENT
Start: 2022-04-28 | End: 2022-04-29 | Stop reason: HOSPADM

## 2022-04-27 RX ORDER — PROCHLORPERAZINE MALEATE 10 MG
10 TABLET ORAL EVERY 6 HOURS PRN
Status: DISCONTINUED | OUTPATIENT
Start: 2022-04-27 | End: 2022-04-28 | Stop reason: HOSPADM

## 2022-04-27 RX ORDER — PROCHLORPERAZINE 25 MG
25 SUPPOSITORY, RECTAL RECTAL EVERY 12 HOURS PRN
Status: DISCONTINUED | OUTPATIENT
Start: 2022-04-27 | End: 2022-04-28 | Stop reason: HOSPADM

## 2022-04-27 RX ADMIN — Medication 2 MILLI-UNITS/MIN: at 21:29

## 2022-04-27 RX ADMIN — SODIUM CHLORIDE, POTASSIUM CHLORIDE, SODIUM LACTATE AND CALCIUM CHLORIDE 500 ML: 600; 310; 30; 20 INJECTION, SOLUTION INTRAVENOUS at 20:59

## 2022-04-27 RX ADMIN — SODIUM CHLORIDE, POTASSIUM CHLORIDE, SODIUM LACTATE AND CALCIUM CHLORIDE: 600; 310; 30; 20 INJECTION, SOLUTION INTRAVENOUS at 21:00

## 2022-04-27 ASSESSMENT — ACTIVITIES OF DAILY LIVING (ADL)
DIFFICULTY_EATING/SWALLOWING: NO
DRESSING/BATHING_DIFFICULTY: NO
WEAR_GLASSES_OR_BLIND: NO
TOILETING_ISSUES: NO
HEARING_DIFFICULTY_OR_DEAF: NO
WALKING_OR_CLIMBING_STAIRS_DIFFICULTY: NO
CHANGE_IN_FUNCTIONAL_STATUS_SINCE_ONSET_OF_CURRENT_ILLNESS/INJURY: NO
CONCENTRATING,_REMEMBERING_OR_MAKING_DECISIONS_DIFFICULTY: NO
DOING_ERRANDS_INDEPENDENTLY_DIFFICULTY: NO
FALL_HISTORY_WITHIN_LAST_SIX_MONTHS: NO
DIFFICULTY_COMMUNICATING: NO

## 2022-04-27 NOTE — LETTER
Date:April 28, 2022      Provider requested that no letter be sent. Do not send.       [facility]

## 2022-04-27 NOTE — LETTER
"2022       RE: Soraida Johnson  [address] [address]     Dear Colleague,    Thank you for referring your patient, Soraida Johnson, to the [facility] WOMEN'S CLINIC [place] at [facility]. Please see a copy of my visit note below.    SUBJECTIVE   Much pelvic pressure, no ctx really  Good FM  denies LOF/vb/preE sx    OBJECTIVE   BP (!) 144/92   Pulse 69   Ht 1.676 m (5' 6\")   Wt 76.9 kg (169 lb 8 oz)   LMP 2021   BMI 27.36 kg/m       Repeat /100  See Ob Flowsheet  3/60/-3/mid/soft: Portillo 7, cephalic by sutures, membranes stripped per request    ASSESSMENT & PLAN   31 year old  at 39w0d, FIONA.    Patient Active Problem List    Diagnosis Date Noted     Supervision of normal first pregnancy 10/20/2021     Priority: High     W[facility] MD pt    Partner's name: Surya    [x] NOB folder  [x] Dating: changed by 7 week ultrasound (long cycles)  [x] Rh: pos  [x] Rubella immune  [x] Hep B immune  [x] Pap: NILM 2019, plan PP    [x] First tri screen: wnl  [x] cffDNA: wnl  [x] AFP wnl  [x] Fetal anatomy US: wnl  _____________________________________  [x] EOB folder reviewed  [ ] PP Contraception plan:  [x] Labor plans: 39 v. 40 weeks IOL, thinking IOL week of May 2-  [x] Infant feeding plan: BF  [x] Influenza vaccine  [x] COVID vaccine  [x] TDAP vaccine - 22  [x] GCT - 144  [x] GTT - wnl ( values elevated)  ________________________________________  [x] GBS 22  [x] OTC meds          PreE labs now, if abnormal, plan IOL later this franklin  Monitor BP at home (has cuff, has been normal until today)    Fay Alatorre MD MPH              Again, thank you for allowing me to participate in the care of your patient.      Sincerely,    Eli Alatorre MD      "

## 2022-04-27 NOTE — PROGRESS NOTES
"SUBJECTIVE   Much pelvic pressure, no ctx really  Good FM  denies LOF/vb/preE sx    OBJECTIVE   BP (!) 144/92   Pulse 69   Ht 1.676 m (5' 6\")   Wt 76.9 kg (169 lb 8 oz)   LMP 2021   BMI 27.36 kg/m       Repeat /100  See Ob Flowsheet  3/60/-3/mid/soft: Portillo 7, cephalic by sutures, membranes stripped per request    ASSESSMENT & PLAN   31 year old  at 39w0d, FIONA.    Patient Active Problem List    Diagnosis Date Noted     Supervision of normal first pregnancy 10/20/2021     Priority: High     [facility] MD pt    Partner's name: Surya    [x] NOB folder  [x] Dating: changed by 7 week ultrasound (long cycles)  [x] Rh: pos  [x] Rubella immune  [x] Hep B immune  [x] Pap: NILM , plan PP    [x] First tri screen: wnl  [x] cffDNA: wnl  [x] AFP wnl  [x] Fetal anatomy US: wnl  _____________________________________  [x] EOB folder reviewed  [ ] PP Contraception plan:  [x] Labor plans: 39 v. 40 weeks IOL, thinking IOL week of May 2-  [x] Infant feeding plan: BF  [x] Influenza vaccine  [x] COVID vaccine  [x] TDAP vaccine - 22  [x] GCT - 144  [x] GTT - wnl ( values elevated)  ________________________________________  [x] GBS 22  [x] OTC meds          PreE labs now, if abnormal, plan IOL later this franklin  Monitor BP at home (has cuff, has been normal until today)    Fay Alatorre MD MPH          "

## 2022-04-28 ENCOUNTER — ANESTHESIA (OUTPATIENT)
Dept: OBGYN | Facility: CLINIC | Age: 32
End: 2022-04-28
Payer: COMMERCIAL

## 2022-04-28 ENCOUNTER — ANESTHESIA EVENT (OUTPATIENT)
Dept: OBGYN | Facility: CLINIC | Age: 32
End: 2022-04-28
Payer: COMMERCIAL

## 2022-04-28 LAB — T PALLIDUM AB SER QL: NONREACTIVE

## 2022-04-28 PROCEDURE — 258N000003 HC RX IP 258 OP 636: Performed by: STUDENT IN AN ORGANIZED HEALTH CARE EDUCATION/TRAINING PROGRAM

## 2022-04-28 PROCEDURE — 370N000003 HC ANESTHESIA WARD SERVICE

## 2022-04-28 PROCEDURE — 250N000011 HC RX IP 250 OP 636: Performed by: STUDENT IN AN ORGANIZED HEALTH CARE EDUCATION/TRAINING PROGRAM

## 2022-04-28 PROCEDURE — 0KQM0ZZ REPAIR PERINEUM MUSCLE, OPEN APPROACH: ICD-10-PCS | Performed by: OBSTETRICS & GYNECOLOGY

## 2022-04-28 PROCEDURE — 120N000002 HC R&B MED SURG/OB UMMC

## 2022-04-28 PROCEDURE — 3E0R3BZ INTRODUCTION OF ANESTHETIC AGENT INTO SPINAL CANAL, PERCUTANEOUS APPROACH: ICD-10-PCS | Performed by: ANESTHESIOLOGY

## 2022-04-28 PROCEDURE — 722N000001 HC LABOR CARE VAGINAL DELIVERY SINGLE

## 2022-04-28 PROCEDURE — 250N000013 HC RX MED GY IP 250 OP 250 PS 637: Performed by: STUDENT IN AN ORGANIZED HEALTH CARE EDUCATION/TRAINING PROGRAM

## 2022-04-28 PROCEDURE — 59410 OBSTETRICAL CARE: CPT | Mod: GC | Performed by: OBSTETRICS & GYNECOLOGY

## 2022-04-28 PROCEDURE — 250N000009 HC RX 250: Performed by: STUDENT IN AN ORGANIZED HEALTH CARE EDUCATION/TRAINING PROGRAM

## 2022-04-28 PROCEDURE — 00HU33Z INSERTION OF INFUSION DEVICE INTO SPINAL CANAL, PERCUTANEOUS APPROACH: ICD-10-PCS | Performed by: ANESTHESIOLOGY

## 2022-04-28 RX ORDER — BISACODYL 10 MG
10 SUPPOSITORY, RECTAL RECTAL DAILY PRN
Status: DISCONTINUED | OUTPATIENT
Start: 2022-04-28 | End: 2022-04-29 | Stop reason: HOSPADM

## 2022-04-28 RX ORDER — MISOPROSTOL 200 UG/1
TABLET ORAL
Status: DISCONTINUED
Start: 2022-04-28 | End: 2022-04-28 | Stop reason: HOSPADM

## 2022-04-28 RX ORDER — FENTANYL CITRATE-0.9 % NACL/PF 10 MCG/ML
100 PLASTIC BAG, INJECTION (ML) INTRAVENOUS EVERY 5 MIN PRN
Status: DISCONTINUED | OUTPATIENT
Start: 2022-04-28 | End: 2022-04-28 | Stop reason: HOSPADM

## 2022-04-28 RX ORDER — FENTANYL/ROPIVACAINE/NS/PF 2MCG/ML-.1
PLASTIC BAG, INJECTION (ML) EPIDURAL
Status: DISCONTINUED | OUTPATIENT
Start: 2022-04-28 | End: 2022-04-28 | Stop reason: HOSPADM

## 2022-04-28 RX ORDER — MISOPROSTOL 200 UG/1
400 TABLET ORAL
Status: DISCONTINUED | OUTPATIENT
Start: 2022-04-28 | End: 2022-04-29 | Stop reason: HOSPADM

## 2022-04-28 RX ORDER — METHYLERGONOVINE MALEATE 0.2 MG/ML
200 INJECTION INTRAVENOUS
Status: DISCONTINUED | OUTPATIENT
Start: 2022-04-28 | End: 2022-04-29 | Stop reason: HOSPADM

## 2022-04-28 RX ORDER — LIDOCAINE HYDROCHLORIDE AND EPINEPHRINE 15; 5 MG/ML; UG/ML
INJECTION, SOLUTION EPIDURAL PRN
Status: DISCONTINUED | OUTPATIENT
Start: 2022-04-28 | End: 2022-04-28

## 2022-04-28 RX ORDER — OXYTOCIN/0.9 % SODIUM CHLORIDE 30/500 ML
PLASTIC BAG, INJECTION (ML) INTRAVENOUS
Status: DISCONTINUED
Start: 2022-04-28 | End: 2022-04-28 | Stop reason: HOSPADM

## 2022-04-28 RX ORDER — HYDROCORTISONE 2.5 %
CREAM (GRAM) TOPICAL 3 TIMES DAILY PRN
Status: DISCONTINUED | OUTPATIENT
Start: 2022-04-28 | End: 2022-04-29 | Stop reason: HOSPADM

## 2022-04-28 RX ORDER — ACETAMINOPHEN 325 MG/1
650 TABLET ORAL EVERY 4 HOURS PRN
Status: DISCONTINUED | OUTPATIENT
Start: 2022-04-28 | End: 2022-04-29 | Stop reason: HOSPADM

## 2022-04-28 RX ORDER — OXYTOCIN 10 [USP'U]/ML
INJECTION, SOLUTION INTRAMUSCULAR; INTRAVENOUS
Status: DISCONTINUED
Start: 2022-04-28 | End: 2022-04-28 | Stop reason: HOSPADM

## 2022-04-28 RX ORDER — MISOPROSTOL 200 UG/1
800 TABLET ORAL
Status: DISCONTINUED | OUTPATIENT
Start: 2022-04-28 | End: 2022-04-29 | Stop reason: HOSPADM

## 2022-04-28 RX ORDER — OXYTOCIN/0.9 % SODIUM CHLORIDE 30/500 ML
340 PLASTIC BAG, INJECTION (ML) INTRAVENOUS CONTINUOUS PRN
Status: DISCONTINUED | OUTPATIENT
Start: 2022-04-28 | End: 2022-04-29 | Stop reason: HOSPADM

## 2022-04-28 RX ORDER — OXYTOCIN 10 [USP'U]/ML
10 INJECTION, SOLUTION INTRAMUSCULAR; INTRAVENOUS
Status: DISCONTINUED | OUTPATIENT
Start: 2022-04-28 | End: 2022-04-29 | Stop reason: HOSPADM

## 2022-04-28 RX ORDER — CARBOPROST TROMETHAMINE 250 UG/ML
250 INJECTION, SOLUTION INTRAMUSCULAR
Status: DISCONTINUED | OUTPATIENT
Start: 2022-04-28 | End: 2022-04-29 | Stop reason: HOSPADM

## 2022-04-28 RX ORDER — TRANEXAMIC ACID 10 MG/ML
1 INJECTION, SOLUTION INTRAVENOUS EVERY 30 MIN PRN
Status: DISCONTINUED | OUTPATIENT
Start: 2022-04-28 | End: 2022-04-29 | Stop reason: HOSPADM

## 2022-04-28 RX ORDER — NALBUPHINE HYDROCHLORIDE 10 MG/ML
2.5-5 INJECTION, SOLUTION INTRAMUSCULAR; INTRAVENOUS; SUBCUTANEOUS EVERY 6 HOURS PRN
Status: DISCONTINUED | OUTPATIENT
Start: 2022-04-28 | End: 2022-04-28

## 2022-04-28 RX ORDER — LIDOCAINE HYDROCHLORIDE 10 MG/ML
INJECTION, SOLUTION EPIDURAL; INFILTRATION; INTRACAUDAL; PERINEURAL
Status: DISCONTINUED
Start: 2022-04-28 | End: 2022-04-28 | Stop reason: HOSPADM

## 2022-04-28 RX ORDER — DOCUSATE SODIUM 100 MG/1
100 CAPSULE, LIQUID FILLED ORAL DAILY
Status: DISCONTINUED | OUTPATIENT
Start: 2022-04-28 | End: 2022-04-29 | Stop reason: HOSPADM

## 2022-04-28 RX ORDER — MODIFIED LANOLIN
OINTMENT (GRAM) TOPICAL
Status: DISCONTINUED | OUTPATIENT
Start: 2022-04-28 | End: 2022-04-29 | Stop reason: HOSPADM

## 2022-04-28 RX ORDER — IBUPROFEN 800 MG/1
800 TABLET, FILM COATED ORAL EVERY 6 HOURS PRN
Status: DISCONTINUED | OUTPATIENT
Start: 2022-04-28 | End: 2022-04-29 | Stop reason: HOSPADM

## 2022-04-28 RX ADMIN — IBUPROFEN 800 MG: 800 TABLET, FILM COATED ORAL at 10:35

## 2022-04-28 RX ADMIN — LIDOCAINE HYDROCHLORIDE,EPINEPHRINE BITARTRATE 3 ML: 15; .005 INJECTION, SOLUTION EPIDURAL; INFILTRATION; INTRACAUDAL; PERINEURAL at 05:19

## 2022-04-28 RX ADMIN — LIDOCAINE HYDROCHLORIDE 10 ML: 10 INJECTION, SOLUTION EPIDURAL; INFILTRATION; INTRACAUDAL; PERINEURAL at 09:32

## 2022-04-28 RX ADMIN — ACETAMINOPHEN 650 MG: 325 TABLET ORAL at 21:01

## 2022-04-28 RX ADMIN — ACETAMINOPHEN 650 MG: 325 TABLET ORAL at 16:52

## 2022-04-28 RX ADMIN — BUPIVACAINE HYDROCHLORIDE 10 ML: 2.5 INJECTION, SOLUTION EPIDURAL; INFILTRATION; INTRACAUDAL at 05:23

## 2022-04-28 RX ADMIN — ESCITALOPRAM 5 MG: 5 TABLET, FILM COATED ORAL at 10:34

## 2022-04-28 RX ADMIN — IBUPROFEN 800 MG: 800 TABLET, FILM COATED ORAL at 23:43

## 2022-04-28 RX ADMIN — ROPIVACAINE HYDROCHLORIDE: 2 INJECTION, SOLUTION EPIDURAL; INFILTRATION at 05:35

## 2022-04-28 RX ADMIN — IBUPROFEN 800 MG: 800 TABLET, FILM COATED ORAL at 16:52

## 2022-04-28 NOTE — H&P
OB History and Physical     Soraida Johnson    MRN# [id number]  YOB: 1990      HPI: Soraida Johnson is a 31 year old  at 39w0d by LMP c/w 7w5d US who presents today for induction of labor.    Patient was sent over from clinic for new elevated blood pressures meeting criteria for gestational hypertension. Patient states she feels well. Denies headaches, changes in vision, SOB, RUQ pain.     She reports good fetal movement. Denies LOF, vaginal bleeding, or contractions.  She denies fever, chills, SOB, chest pain, palpitations, N/V, LE swelling/tenderness.  No concerns for headache, vision changes, RUQ or epigastric pain.       Pregnancy notable for:   - gHTN  - EMILI  - Elevated GCT, normal GTT    Prenatal Labs:   Lab Results   Component Value Date    AS Negative 10/18/2021    HEPBANG Nonreactive 10/18/2021    CHPCRT Negative 11/10/2021    GCPCRT Negative 11/10/2021    HGB 12.3 2022       GBS Status:   No results found for: GBS      OBHX:   OB History    Para Term  AB Living   1 0 0 0 0 0   SAB IAB Ectopic Multiple Live Births   0 0 0 0 0      # Outcome Date GA Lbr Hayden/2nd Weight Sex Delivery Anes PTL Lv   1 Current                MedicalHX:   Past Medical History:   Diagnosis Date     NO ACTIVE PROBLEMS        SurgicalHX:   Past Surgical History:   Procedure Laterality Date     NO HISTORY OF SURGERY         Medications:   No current facility-administered medications on file prior to encounter.  escitalopram (LEXAPRO) 5 MG tablet, Take 1 tablet (5 mg) by mouth daily  Prenatal Vit-DSS-Fe Cbn-FA (PRENATAL AD PO),         Allergies:  No Known Allergies    FamilyHX:  History reviewed. No pertinent family history.    SocialHX:   Social History     Socioeconomic History     Marital status: Single   Occupational History     Occupation: doctor     Employer: [employer]     Comment: gyn/onc fellow   Tobacco Use     Smoking status: Never Smoker     Smokeless tobacco: Never  Used   Substance and Sexual Activity     Alcohol use: Yes     Drug use: Never     Sexual activity: Yes     Partners: Male     Birth control/protection: I.U.D.   Social History Narrative    Grew up [facility]    Ob/Gyn residency in [place]    [employer] Gyn Onc fellow [phone]       ROS: 10 point ROS negative other than above    Physical Exam:  Patient Vitals for the past 24 hrs:   BP Temp Temp src Resp   22 1940 116/71 98  F (36.7  C) Oral 16     General: AAOx3, appropriately interactive, NAD, appears generally well  CV: RRR, normal S1/S2, no m/r/g  Lungs: CTAB, non-labored breathing, no wheezes, rales, or rhonchi  Abdomen: soft, gravid, non-tender, EFW 7.5#; ceph by BSUS  SVE:  3/60/-3, soft, mid position  Extremities: Non-tender with trace edema bilaterally in LE    FHT: 170 moderate variability, accels present, no decels   Strong: 1-2 contractions in ten minutes    Labs:   HELLP labs normal this afternoon    Assessment & Plan: 31 year old  at 39w0d by LMP c/w 7w5d US, here for induction of labor. Pregnancy is notable for gHTN, EMILI, elevated GCT, normal GTT.     # Induction of Labor  - Admit for IOL in the setting of gHTN  - Cervix: 3/60/-3, favorable   - Given above Bishops score, will plan to start pitocin  - Membranes: Intact  - Augmentation: Pitocin, AROM PRN  - Labs: CBC, T&S, RPR, HELLP labs normal, COVID  - GBS neg; Antibiotics not indicated.  - Pain Control: Per patient request; Discussed options    - Vistaril and IM Morphine for therapeutic sleep   - Desires epidural in active labor.   - Diet: Regular in early labor. NPO in active labor  - PPH Meds: Standard, avoid methergine  - PPx: SCDs    # gHTN  - Serial BP monitoring   - IV Antihypertensives prn for sustained severe range blood pressures (>160/>110)  - Labs: HELLP labs, UPC 0.13    # EMILI  - Continue PTA Lexapro 5mg daily    # PNC  - Rh +, Rubella , , GTT 80/176/178/80, GBS neg  - Other prenatal labs wnl  - Imaging: Placenta  posterior, EFW 69%ile, AC 68%ile  - Pap last NILM 2019, HPV neg     # FWB:   - Cat I tracing, reactive but tachycardic. Will observe once fluids start.  - Ceph by BSUS; EFW 7.5#  - Continuous Fetal Monitoring    Patient discussed with Dr. Alatorre.    Mark Serrano MD  Obstetrics, Gynecology, and Women's Health  PGY2  8:59 PM 04/27/2022      I examined Soraida Rogerslinger on 4/27/2022 with Dr. Serrano and agree with the presentation, exam and plan of care documented in this note with edits by me.   Fay Alatorre MD MPH

## 2022-04-28 NOTE — PLAN OF CARE
Data: Patient admitted to room 477 at 1900. Patient is a . Prenatal record reviewed.   OB History    Para Term  AB Living   1 0 0 0 0 0   SAB IAB Ectopic Multiple Live Births   0 0 0 0 0      # Outcome Date GA Lbr Hayden/2nd Weight Sex Delivery Anes PTL Lv   1 Current            .  Medical History:   Past Medical History:   Diagnosis Date     NO ACTIVE PROBLEMS    .  Gestational age 39w0d. Vital signs per doc flowsheet. Fetal movement present. Patient reports No chief complaint on file.   as reason for admission. Support persons, Surya is present.  Action: Care of patient assumed at 1915. Verbal consent for EFM, external fetal monitors applied. Admission assessment completed. Patient and support persons educated on labor process. Patient instructed to report change in fetal movement, contractions, vaginal leaking of fluid or bleeding, abdominal pain, or any concerns related to the pregnancy to her nurse/physician. Patient oriented to room, call light in reach.   Response: Dr. Alatorre and Dr. Serrano informed of arrival. Plan per provider is induce w/ pitocin. Patient verbalized understanding of education and verbalized agreement with plan. Patient coping with labor via relaxation and support person.

## 2022-04-28 NOTE — PLAN OF CARE
VSS, afebrile, AROM at 0400, clear fluid. Epidural placed and started 0530. Pitocin started 2129. Resting comfortably w/ peanut ball and pillows in place. Surya, , at bedside. Will continue w/ plan of care and monitor closely. Report given to Sergio at 0715.

## 2022-04-28 NOTE — PROGRESS NOTES
Labor Progress Note     S: Feeling more uncomfortable with contractions, but not too intense yet.     O:  /71   Temp 98  F (36.7  C) (Oral)   Resp 16   LMP 2021   Breastfeeding No   General: Well, resting in bed  VE: 4-5/60/-2    FHT: Baseline 1145, mod variability, accelerations present, no decelerations  TOCO: 1-2 contractions in ten minutes    A/P: 31 year old  at 39w1d, here for induction of labor in setting of gHTN.    # Induction of Labor  - Cervix: 360/-3 > pit () > 4-5/60/-2 (0005). Continue to titrate pitocin. Will rupture when appropriate.   - Membranes: Intact  - Augmentation: Pitocin, AROM PRN  - Labs: CBC, T&S, RPR, HELLP labs normal, COVID  - GBS neg; Antibiotics not indicated.  - Pain Control: Per patient request. Desires epidural in active labor.   - Diet: Regular in early labor. NPO in active labor  - PPH Meds: Standard, avoid methergine  - PPx: SCDs     # gHTN  - Serial BP monitoring   - IV Antihypertensives prn for sustained severe range blood pressures (>160/>110)  - Labs: HELLP labs, UPC 0.13     # EMILI  - Continue PTA Lexapro 5mg daily     # PNC  - Rh +, Rubella , , GTT 80/176/178/80, GBS neg  - Other prenatal labs wnl  - Imaging: Placenta posterior, EFW 69%ile, AC 68%ile  - Pap last NILM , HPV neg                 # FWB:   - Cat I tracing, reactive but tachycardic. Will observe once fluids start.  - Ceph by BSUS; EFW 7.5#  - Continuous Fetal Monitoring    Mark Serrano MD  Obstetrics, Gynecology, and Women's Health  PGY2  12:07 AM 2022

## 2022-04-28 NOTE — PROGRESS NOTES
Labor Progress Note     S: Comfortable with epidural    O:  /71   Temp 97.5  F (36.4  C) (Oral)   Resp 16   LMP 2021   SpO2 96%   Breastfeeding No   General: Well, resting in bed  VE: anterior lip/100/+1    FHT: Baseline 135, mod variability, accelerations present, isolated variable decel  TOCO: 3-4 contractions in ten minutes    A/P: 31 year old  at 39w1d, here for induction of labor in setting of gHTN.    # Induction of Labor  - Cervix: anterior lip > repositioning. Repeat SVE in 30-60 minutes  - Membranes: s/p AROM  - GBS neg; Antibiotics not indicated.  - Pain Control: epidural in place  - Diet: clears  - PPH Meds: Standard, avoid methergine  - PPx: SCDs     # gHTN  - Serial BP monitoring   - IV Antihypertensives prn for sustained severe range blood pressures (>160/>110)  - Labs: HELLP labs, UPC 0.13                 # FWB  - Overall category I, reactive and reassuring  - Ceph by BSUS; EFW 7.5#  - Continuous Fetal Monitoring    Madelyn Casas MD  ObGyn, PGY-2  22 7:52 AM

## 2022-04-28 NOTE — L&D DELIVERY NOTE
OB Vaginal Delivery Note    Soraida Johnson MRN# [id number]   Age: 31 year old YOB: 1990     L&D Delivery Note:     Soraida Johnson is a 31 year old now  who presented at 39w1d for IOL for gestational HTN.  Pregnancy was notable for EMILI. Her IOL began with pitocin as she was 3/60/-3 on admission. She was augmented with AROM.  Labor course was uncomplicated.  She progressed to complete and pushed for about 40 minutes, after which she had a spontaneous vaginal delivery of a viable male infant in SHINE position.  No nuchal cord was noted.  Apgars of 8 and 9 with weight.  The cord was double clamped after 60 seconds and cut.  A cord segment and cord blood were obtained.  IV pitocin was started. The placenta was then delivered using gentle traction and suprapubic pressure.  The uterus was noted to be firm after fundal massage.  The perineum was assessed for lacerations and small bilateral periurethral lacerations were noted, however not bleeding and thus not repaired. A second degree perineal laceration was also noted and was repaired in standard fashion using 3-0 vicryl suture.  On final examination of the perineum, the repair was noted to be hemostatic.  Total QBL was 129cc.  The placenta appeared intact with a 3V umbilical cord.  Dr. Cifuentes was present for the entire procedure.     Madelyn Casas MD  ObGyn, PGY-2  22 9:46 AM     I was present for above delivery and repair.   Jeanne Cifuentes MD      GA: 39w1d  GP:   Labor Complications: None   EBL:   mL  Delivery QBL:    Delivery Type: Vaginal, Spontaneous   ROM to Delivery Time: (Delivered) Hours: 5 Minutes: 16  Raphine Weight:     1 Minute 5 Minute 10 Minute   Apgar Totals: 8   9        MADELYN CASAS;AIDA DOWNING;CONSTANTINE GREENBERG     Delivery Details:  Soraida Johnson, a 31 year old  female delivered a viable infant with apgars of 8  and 9 . Patient was fully dilated and pushing after  7  hours 41  minutes in active labor. Delivery was via vaginal, spontaneous  to a sterile field under epidural  anesthesia. Infant delivered in vertex  left  occiput  anterior  position. Anterior and posterior shoulders delivered without difficulty. The cord was clamped, cut twice and 3 vessels  were noted. Cord blood was obtained in routine fashion with the following disposition: lab .      Cord complications: none   Placenta delivered at 2022  9:25 AM . Placental disposition was Hospital disposal . Fundal massage performed and fundus found to be firm.     Episiotomy:     Perineum, vagina, cervix were inspected, and the following lacerations were noted:   Perineal lacerations: 2nd   periurethral laceration: bilateral                AlexLeanne krishnamurthy [[id number]]    Labor Event Times    Labor onset date: 22 Onset time:  1:00 AM   Dilation complete date: 22 Complete time:  8:41 AM   Start pushing date/time: 2022 0842      Labor Length    1st Stage (hrs): 7 (min): 41   2nd Stage (hrs): 0 (min): 40   3rd Stage (hrs): 0 (min): 4      Labor Events     labor?: No   steroids: None  Labor Type: Induction/Cervical ripening, AROM  Predominate monitoring during 1st stage: continuous electronic fetal monitoring     Antibiotics received during labor?: No     Rupture identifier: Sac 1  Rupture date/time: 22 0404   Rupture type: Artificial Rupture of Membranes  Fluid color: Clear  Fluid odor: Normal     Induction: Oxytocin  Induction date/time: 22   Cervical ripening date/time:     Indications for induction: Hypertension     Augmentation: AROM     Delivery/Placenta Date and Time    Delivery Date: 22 Delivery Time:  9:21 AM   Placenta Date/Time: 2022  9:25 AM  Oxytocin given at the time of delivery: after delivery of baby  Delivering clinician: Jeanne Cifuentes MD   Other personnel present at delivery:  Provider Role   Madelyn Casas MD Flahaven, Kris A  Tiffanie Anna RN          Vaginal Counts     Initial count performed by 2 team members:  Two Team Members   Desmond Parra       [place] Suture Needles Sponges (RETIRED) Instruments   Initial counts 2 0 5    Added to count 0 1 0    Relief counts       Final counts             Placed during labor Accounted for at the end of labor   FSE No NA   IUPC No NA   Cervidil No NA                     Apgars    Living status: Living   1 Minute 5 Minute 10 Minute 15 Minute 20 Minute   Skin color: 0  1       Heart rate: 2  2       Reflex irritability: 2  2       Muscle tone: 2  2       Respiratory effort: 2  2       Total: 8  9       Apgars assigned by: BEN GREENBERG RN     Cord    Vessels: 3 Vessels    Cord Complications: None               Cord Blood Disposition: Lab    Gases Sent?: No    Delayed cord clamping?: Yes    Cord Clamping Delay (seconds):  seconds        Resuscitation    Methods: None   Care at Delivery: Baby delivered and placed skin to skin, spontaneous cry, dried and stimulated.  Output in Delivery Room: Stool     Bynum Measurements    Output in delivery room: Stool     Skin to Skin and Feeding Plan    Skin to skin initiation date/time: 1841    Skin to skin with: Mother  Skin to skin end date/time:        Delivery (Maternal) (Provider to Complete) (555574)    Perineal lacerations: 2nd Repaired?: Yes   Periurethral laceration: bilateral Repaired?: No   Repair suture: 3-0 Vicryl     Blood Loss  Mother: AlexSoraida krishnamurthy #[id number]   Start of Mother's Information    Delivery Blood Loss  22 0100 - 22 0946    None           End of Mother's Information  Mother: Soraida Johnson #[id number]          Delivery - Provider to Complete ([id number])    Delivering clinician: Jeanne Cifuentes MD  Delivery Type (Choose the 1 that will go to the Birth History): Vaginal, Spontaneous                   Other personnel:  Provider Role   Madelyn Casas MD     Sergio Parra RN Riermann, Kari A, RN                 Placenta    Date/Time: 4/28/2022  9:25 AM  Removal: Spontaneous  Disposition: Hospital disposal           Anesthesia    Method: Epidural                Presentation and Position    Presentation: Vertex    Position: Left Occiput Anterior                 Madelyn Casas MD

## 2022-04-28 NOTE — PLAN OF CARE
Goal Outcome Evaluation:    Vaginal Delivery Note   of viable Male with Dr KENTON Cifuentes and Dr Casas in attendance. Nursery RN present.  Infant with spontaneous cry, to mother's abdomen, dried and stimulated, Apgars 8 and 9.  Placenta delivered with out complication, second degree laceration with repair, merced cares provided. Mother and baby in stable condition.

## 2022-04-28 NOTE — CARE PLAN
Data: Soraida Johnson transferred to 71 via wheelchair at 1130. Baby transferred via parent's arms.  Action: Receiving unit notified of transfer: Yes. Patient and family notified of room change. Report given to Keri at 1130. Belongings sent to receiving unit. Accompanied by Registered Nurse. Oriented patient to surroundings. Call light within reach. ID bands double-checked with receiving RN.  Response: Patient tolerated transfer and is stable.

## 2022-04-28 NOTE — PROGRESS NOTES
Labor Progress Note     S: Feeling more uncomfortable with contractions, but not too intense yet. Amenable to AROM at this time.     O:  /72   Temp 97.8  F (36.6  C) (Oral)   Resp 16   LMP 2021   Breastfeeding No   General: Well, resting in bed  VE: 5/70/-2, AROMed    FHT: Baseline 135, mod variability, accelerations present, no decelerations  TOCO: 3-4 contractions in ten minutes    A/P: 31 year old  at 39w1d, here for induction of labor in setting of gHTN.    # Induction of Labor  - Cervix: 3/60/-3 > pit () > 4-5/60/-2 (0005) > 5/70/-2, AROM (405). Continue to titrate pitocin.   - Membranes: Intact  - Labs: CBC, T&S, RPR, HELLP labs normal, COVID  - GBS neg; Antibiotics not indicated.  - Pain Control: Per patient request. Desires epidural in active labor.   - Diet: Regular in early labor. NPO in active labor  - PPH Meds: Standard, avoid methergine  - PPx: SCDs     # gHTN  - Serial BP monitoring   - IV Antihypertensives prn for sustained severe range blood pressures (>160/>110)  - Labs: HELLP labs, UPC 0.13     # EMILI  - Continue PTA Lexapro 5mg daily     # PNC  - Rh +, Rubella , , GTT 80/176/178/80, GBS neg  - Other prenatal labs wnl  - Imaging: Placenta posterior, EFW 69%ile, AC 68%ile  - Pap last NILM , HPV neg                 # FWB:   - Cat I tracing, reactive but tachycardic. Will observe once fluids start.  - Ceph by BSUS; EFW 7.5#  - Continuous Fetal Monitoring    Mark Serrano MD  Obstetrics, Gynecology, and Women's Health  PGY2  4:08 AM 2022

## 2022-04-28 NOTE — ANESTHESIA PROCEDURE NOTES
Epidural catheter Procedure Note    Pre-Procedure   Staff -        Anesthesiologist:  Ruthann Daily MD       Resident/Fellow: Adarsh Blackwood MD       Performed By: resident       Location: OB       Pre-Anesthestic Checklist: patient identified, IV checked, risks and benefits discussed, informed consent, monitors and equipment checked, pre-op evaluation, at physician/surgeon's request and post-op pain management  Timeout:       Correct Patient: Yes        Correct Procedure: Yes        Correct Site: Yes        Correct Position: Yes   Procedure Documentation  Procedure: epidural catheter       Patient Position: sitting       Skin prep: Chloraprep       Insertion Site: L3-4. (midline approach).       Technique: LORT saline        SANTY at 5.5 cm.       Needle Type: Touhy needle       Needle Gauge: 17.        Needle Length (Inches): 3.5        Catheter: 19 G.          Catheter threaded easily.         4 cm epidural space.         Threaded 9.5 cm at skin.         # of attempts: 1 and  # of redirects:  0    Assessment/Narrative         Paresthesias: No.       Test dose of 3 mL lidocaine 1.5% w/ 1:200,000 epinephrine at 05:19 CDT.         Test dose negative, 3 minutes after injection, for signs of intravascular, subdural, or intrathecal injection.       Insertion/Infusion Method: LORT saline       Aspiration negative for Heme or CSF via Epidural Catheter.    Medication(s) Administered   0.125% bupivacaine 5 mL + fentanyl 20 mcg + NS 5 mL (Epidural) (Mixture components: bupivacaine HCl (PF) 0.25 % Soln, 5 mL; fentaNYL (PF) 100 MCG/2ML Soln, 20 mcg; sodium chloride 0.9 % Soln, 5 mL) - EPIDURAL   10 mL - 4/28/2022 5:23:00 AM

## 2022-04-28 NOTE — PROGRESS NOTES
Patient arrived to [facility] unit via wheelchair at 1136,with belongings, accompanied by spouse/ significant other, with infant in arms. Received report from Sergio SON RN and checked bands. Unit and room orientation completd. Call light given and within arms reach; no concerns present at this time. Continue with plan of care.

## 2022-04-28 NOTE — ANESTHESIA PREPROCEDURE EVALUATION
Anesthesia Pre-Procedure Evaluation    Patient: Soraida Johnson   MRN: [id number] : 1990        Procedure : * No procedures listed *          Past Medical History:   Diagnosis Date     NO ACTIVE PROBLEMS       Past Surgical History:   Procedure Laterality Date     NO HISTORY OF SURGERY        No Known Allergies   Social History     Tobacco Use     Smoking status: Never Smoker     Smokeless tobacco: Never Used   Substance Use Topics     Alcohol use: Yes      Wt Readings from Last 1 Encounters:   22 76.9 kg (169 lb 8 oz)        Anesthesia Evaluation            ROS/MED HX  ENT/Pulmonary:  - neg pulmonary ROS     Neurologic:  - neg neurologic ROS     Cardiovascular: Comment: gHTN   (-) PIH   METS/Exercise Tolerance:     Hematologic:  - neg hematologic  ROS     Musculoskeletal:       GI/Hepatic:  - neg GI/hepatic ROS     Renal/Genitourinary:       Endo:  - neg endo ROS     Psychiatric/Substance Use:  - neg psychiatric ROS     Infectious Disease:       Malignancy:       Other:            Physical Exam    Airway        Mallampati: II   TM distance: > 3 FB   Neck ROM: full   Mouth opening: > 3 cm    Respiratory Devices and Support         Dental  no notable dental history         Cardiovascular   cardiovascular exam normal          Pulmonary   pulmonary exam normal                OUTSIDE LABS:  CBC:   Lab Results   Component Value Date    WBC 9.8 2022    WBC 10.6 2022    HGB 12.3 2022    HGB 12.6 2022    HCT 35.5 2022    HCT 36.0 2022     2022     2022     BMP:   Lab Results   Component Value Date     2022     10/18/2021    POTASSIUM 3.7 2022    POTASSIUM 4.2 10/18/2021    CHLORIDE 104 2022    CHLORIDE 107 10/18/2021    CO2 21 2022    CO2 26 10/18/2021    BUN 11 2022    BUN 10 10/18/2021    CR 0.63 2022    CR 0.64 10/18/2021    GLC 83 2022    GLC 75 10/18/2021     COAGS: No results  found for: PTT, INR, FIBR  POC: No results found for: BGM, HCG, HCGS  HEPATIC:   Lab Results   Component Value Date    ALT 29 04/27/2022    AST 21 04/27/2022     OTHER:   Lab Results   Component Value Date    CHANDU 8.7 04/27/2022       Anesthesia Plan    ASA Status:  2      Anesthesia Type: Epidural.              Consents    Anesthesia Plan(s) and associated risks, benefits, and realistic alternatives discussed. Questions answered and patient/representative(s) expressed understanding.    - Discussed:     - Discussed with:  Patient         Postoperative Care            Comments:           neg OB ROS.       Adarsh Blackwood MD

## 2022-04-28 NOTE — PROVIDER NOTIFICATION
04/27/22 2000   Provider Notification   Provider Name/Title Dr. Proctor   Method of Notification At Bedside   Notification Reason Patient Arrived   Dr. Proctor at bedside and discussing with patient about admission process. Labs, covid swab, and IV placed. Will give 500 ml fluid bolus and start pitocin. Started pitocin at 2129. Will continue to monitor and titrate pitocin per orders.

## 2022-04-28 NOTE — DISCHARGE SUMMARY
[facility] Discharge Summary    Soraida Johnson MRN# [id number]   Age: 31 year old YOB: 1990     Date of Admission:  2022  Date of Discharge::  2022  Admitting Physician:  Fay Alatorre MD  Discharge Physician:  Ena Gallego MD            Admission Diagnoses:   -   - IUP at 39w1d  - gHTN  - EMILI          Discharge Diagnosis:   - Postpartum, s/p delivery of viable infant            Procedures:     Procedure(s): - Normal spontaneous vaginal delivery  - Epidural anesthesia               Medications Prior to Admission:     Medications Prior to Admission   Medication Sig Dispense Refill Last Dose     escitalopram (LEXAPRO) 5 MG tablet Take 1 tablet (5 mg) by mouth daily 90 tablet 1 2022 at Unknown time     Prenatal Vit-DSS-Fe Cbn-FA (PRENATAL AD PO)    2022 at Unknown time             Discharge Medications:        Review of your medicines      CONTINUE these medicines which have NOT CHANGED      Dose / Directions   escitalopram 5 MG tablet  Commonly known as: LEXAPRO  Used for: Encounter for supervision of normal first pregnancy in third trimester      Dose: 5 mg  Take 1 tablet (5 mg) by mouth daily  Quantity: 90 tablet  Refills: 1     PRENATAL AD PO      Refills: 0                  Consultations:   Anesthesiology          Brief History of Admission and Labor:   Soraida Johnson is a 31 year old now  who presented at 39w1d for IOL for gestational HTN.  Pregnancy was notable for EMILI. Her IOL began with pitocin as she was 3/60/-3 on admission. She was augmented with AROM.  Labor course was uncomplicated.  She progressed to complete and pushed for about 40 minutes, after which she had a spontaneous vaginal delivery of a viable male infant in SHINE position.  No nuchal cord was noted.  Apgars of 8 and 9 with weight.  The cord was double clamped after 60 seconds and cut.  A cord segment and cord blood were obtained.  IV pitocin was started.  The placenta was then delivered using gentle traction and suprapubic pressure.  The uterus was noted to be firm after fundal massage.  The perineum was assessed for lacerations and small bilateral periurethral lacerations were noted, however not bleeding and thus not repaired. A second degree perineal laceration was also noted and was repaired in standard fashion using 3-0 vicryl suture.  On final examination of the perineum, the repair was noted to be hemostatic.  Total QBL was 129cc.          Postpartum Hospital Course:   The patient's postpartum course was unremarkable.  On discharge, her pain was well controlled. Vaginal bleeding is similar to peak menstrual flow.  Voiding without difficulty.  Ambulating well and tolerating a normal diet.  No fever.  Breastfeeding well.  Infant is stable.  She was discharged on post-partum day #1.    Post-partum hemoglobin: 12    Contraception: Mirena IUD at 6w    Rhogam was not indicated          Discharge Instructions and Follow-Up:     Discharge diet: Regular   Discharge activity: Activity as tolerated   Discharge follow-up: Follow up with your primary OBGYN provider in six weeks for a routine postpartum visit    Wound care: Drink plenty of fluids  Ice to area for comfort  Keep wound clean and dry            Discharge Disposition:     Discharged to home      Attestation:  I have reviewed today's vital signs, notes, medications, labs and imaging.    Madelyn Casas MD  ObGyn, PGY-2  04/29/22 1:53 PM

## 2022-04-29 VITALS
DIASTOLIC BLOOD PRESSURE: 89 MMHG | SYSTOLIC BLOOD PRESSURE: 124 MMHG | OXYGEN SATURATION: 99 % | RESPIRATION RATE: 16 BRPM | HEART RATE: 73 BPM | TEMPERATURE: 98 F

## 2022-04-29 LAB — HGB BLD-MCNC: 12 G/DL (ref 11.7–15.7)

## 2022-04-29 PROCEDURE — 36415 COLL VENOUS BLD VENIPUNCTURE: CPT | Performed by: STUDENT IN AN ORGANIZED HEALTH CARE EDUCATION/TRAINING PROGRAM

## 2022-04-29 PROCEDURE — 85018 HEMOGLOBIN: CPT | Performed by: STUDENT IN AN ORGANIZED HEALTH CARE EDUCATION/TRAINING PROGRAM

## 2022-04-29 PROCEDURE — 250N000013 HC RX MED GY IP 250 OP 250 PS 637: Performed by: STUDENT IN AN ORGANIZED HEALTH CARE EDUCATION/TRAINING PROGRAM

## 2022-04-29 RX ADMIN — IBUPROFEN 800 MG: 800 TABLET, FILM COATED ORAL at 13:25

## 2022-04-29 RX ADMIN — DOCUSATE SODIUM 100 MG: 100 CAPSULE, LIQUID FILLED ORAL at 08:42

## 2022-04-29 RX ADMIN — ACETAMINOPHEN 650 MG: 325 TABLET ORAL at 08:42

## 2022-04-29 RX ADMIN — ACETAMINOPHEN 650 MG: 325 TABLET ORAL at 03:52

## 2022-04-29 RX ADMIN — IBUPROFEN 800 MG: 800 TABLET, FILM COATED ORAL at 06:12

## 2022-04-29 RX ADMIN — ESCITALOPRAM 5 MG: 5 TABLET, FILM COATED ORAL at 08:42

## 2022-04-29 NOTE — DISCHARGE SUMMARY
[facility]    Discharge Summary  Obstetrics    Date of Admission:  2022  Date of Discharge:  2022  Discharging Provider: Vicky Neri MD    Discharge Diagnoses   # Routine Postpartum Care  - Heme: Hgb 12.3 >  > 12.0.    - Pain: well-controlled with tylenol, ibuprofen, continue.  - Rh +/Rubella immune. No intervention required.  - Feeding: Breast  - Contraception: mIUD  - Encourage ambulation, continue regular diet, continue bowel regimen      # gHTN  - BP normotensive  - HELLP labs normal on admission     # EMILI  - Continue PTA lexapro    History of Present Illness   Soraida Johnson is a 31 year old female who presented for IOL due to recent diagnosis of GHTN.  uncomplicated. Post partum blood pressure normal, no evidence of severe hypertension.    Hospital Course   The patient's hospital course was unremarkable.  She recovered as anticipated and experienced no post-delivery complications. On discharge, her pain was well controlled. Vaginal bleeding is similar to peak menstrual flow.  Voiding without difficulty.  Ambulating well and tolerating a normal diet.  No fevers.  Breastfeeding well.  Infant is stable.  She was discharged on post-partum day #1.    Post-partum hemoglobin:   Hemoglobin   Date Value Ref Range Status   2022 12.0 11.7 - 15.7 g/dL Final   2020 13.5 11.7 - 15.7 g/dL Final         Vicky Neri MD    Discharge Disposition   Discharged to home   Condition at discharge: Stable    Primary Care Physician   Physician No Ref-Primary    Consultations This Hospital Stay   HOME CARE POST PARTUM/ IP CONSULT  LACTATION IP CONSULT    Discharge Orders      Postpartum Home Care Referral      Activity    Activity as tolerated     Activity    No driving while on narcotics     Reason for your hospital stay    Maternity care     Follow Up    Follow up with provider in 6 weeks for post-delivery and in  1 week for BP check     Breast pump - Manual/Electric    Breast Pump Documentation:  Manual/Electric Pump: To support adequate breast milk production and nutrition for infant.     I, the undersigned, certify that the above prescribed supplies are medically necessary for this patient and is both reasonable and necessary in reference to accepted standards of medical and necessary in reference to accepted standards of medical practice in the treatment of this patient's condition and is not prescribed as a convenience.     Diet    Resume previous diet     Discharge Medications   Current Discharge Medication List      CONTINUE these medications which have NOT CHANGED    Details   escitalopram (LEXAPRO) 5 MG tablet Take 1 tablet (5 mg) by mouth daily  Qty: 90 tablet, Refills: 1    Associated Diagnoses: Encounter for supervision of normal first pregnancy in third trimester      Prenatal Vit-DSS-Fe Cbn-FA (PRENATAL AD PO)            Allergies   No Known Allergies

## 2022-04-29 NOTE — PLAN OF CARE
Goal Outcome Evaluation:    VSS. Fundus Midline, firm, and 1cm below umbilicus. Lochia scant. Pain adequately managed with tylenol/ibuprofen. Voiding without difficulty. Breastfeeding independently with good latch. Bonding well with . Continue with plan of care.    Plan of Care Reviewed With: patient, spouse     Overall Patient Progress: improving

## 2022-04-29 NOTE — PLAN OF CARE
Goal Outcome Evaluation:    Data: Vital signs within normal limits. Postpartum checks within normal limits - see flow record. Patient eating and drinking normally. Patient able to empty bladder independently and is up ambulating. No apparent signs of infection. Perineum healing well. Patient performing self cares and is able to care for infant.  Action: Patient medicated during the shift for pain. See MAR. Patient reassessed within 1 hour after each medication and pain was improved - patient stated she was comfortable. Patient education done about breastfeeding, obtaining deep lathc. See flow record.  Response: Positive attachment behaviors observed with infant. Support persons Surya present.   Plan: Anticipate discharge on 4/29.

## 2022-04-29 NOTE — PLAN OF CARE
Goal Outcome Evaluation:  Patient discharged home today. All assessments and vital signs are WDL. Discharge education provided with verbal understanding. Follow up care discussed with a plan in place and all questions answered.

## 2022-04-29 NOTE — PROGRESS NOTES
Obstetrics Postpartum Progress Note    PPD#1,      S: Patient states she is doing very well.  Pain well-controlled with current pain regimen. Lochia less than menses.  Eating and drinking without nausea/vomiting.  Ambulating without difficulty.  Voiding on her own spontaneously.   Breastfeeding. Denies headache or vision changes.  O:  /85   Pulse 80   Temp 98.4  F (36.9  C) (Oral)   Resp 16   LMP 2021   SpO2 99%   Breastfeeding Unknown     Gen:  NAD  CV: regular rate  Resp: nonlabored breathing on room air  Abd: soft, nondistended, nontender, fundus firm and below umbilicus  Ext: non-tender, trace edema, no erythema    Hemoglobin   Date Value Ref Range Status   2022 12.3 11.7 - 15.7 g/dL Final   2022 12.6 11.7 - 15.7 g/dL Final   2020 13.5 11.7 - 15.7 g/dL Final   2019 13.8 11.7 - 15.7 g/dL Final       A/P: 31 year old  PPD#1 s/p , doing well postpartum. Pregnancy was complicated by gHTN, EMILI.    # Routine Postpartum Care  - Heme: Hgb 12.3 >  > 12.0.    - Pain: well-controlled with tylenol, ibuprofen, continue.  - Rh +/Rubella immune. No intervention required.  - Feeding: Breast  - Contraception: mIUD  - Encourage ambulation, continue regular diet, continue bowel regimen     # gHTN  - BP normotensive  - HELLP labs normal on admission    # EMILI  - Continue PTA lexapro    Dispo: Anticipate discharge home today vs. Tomorrow.    Mark Serrano MD  Obstetrics, Gynecology, and Women's Health  PGY2  10:22 PM 2022    Physician Attestation   I, Vicky Neri MD, personally examined and evaluated this patient.  I discussed the patient with the resident/fellow and care team, and agree with the assessment and plan of care as documented in the note of 22.      I personally reviewed vital signs, medications, and labs.    Key findings: PPD1 after vaginal delivery doing well. GHTN, currently BP have remained normal, no CNS irritability  symptoms, would like to consider going home later today, will follow up.   Vicky Neri MD  Date of Service (when I saw the patient): 4/29/22

## 2022-04-29 NOTE — LACTATION NOTE
This note was copied from a baby's chart.  Consult for: First time breastfeeding going well.    History:  Vaginal delivery @ 39w1d, AGA @ 7# 13 ounce birthweight. Good diaper output thus far, 22 hours old at time of visit. Maternal history of gestational HTN, EMILI managed with Lexapro 5 mg daily, elevated GCT but GTT WNL.     Breast exam of mom: Soft, wider spaced breasts fairly symmetric in size but left with less mammary tissue on underside of breast and slightly bulbous areola. Nipples intact and everted bilaterally. Soraida noted early tenderness & minimal but some bilateral breast growth during pregnancy.     Oral exam of baby: Looks and feels WNL, pushed gloved finger out of mouth was not able to assess suck today.     Feeding assessment:  Infant sleeping skin to skin on arrival, >3.5 hours since last feeding. Offer and mom accept trial of self attaching. Positioned nose to nipple and Michael reached up and latched on his own right away, minimal assist tucking more of areola in his mouth but mom denies pain and he had excellent latch with intermittent swallows, point out deep vs. shallow jaw pulls and one swallow that mom could also hear. He fed both sides and pulled off on his own, nipple rounded after. Taught mom hand expressing and both parents how to spoon feed results.     Education provided: Discussed nose to nipple positioning with good support, anatomy of breast and infant mouth, tips to get and maintain deeper latch, breast massage/compressions prn to enhance milk transfer, nutritive vs. non-nutritive suck and how to hear swallows, benefits of skin to skin and feeding on cue, supply and demand, benefits of and how to do breast massage, hand expression & spoon feed results. Reviewed Five S's for infant soothing, cues for hunger and satiety, baby's second night, how to tell when satiated and if getting enough, what to expect in the coming days and preventing engorgement, breastfeeding log with when and who  to call if concerns, [facility] pump cleaning handout and lactation resources for after discharge encouraged LC appt. Within 5-7 days of discharge to check in on supply.    Feeding Plan: Encouraged frequent skin to skin, breastfeed on cue at least 8 to 12 times per day, hand express after each feeding until milk is in & feed back results. Follow up with outpatient lactation consultant within 5-7 days of discharge for support with first time breastfeeding, check in on supply.

## 2022-05-06 DIAGNOSIS — Z34.03 ENCOUNTER FOR SUPERVISION OF NORMAL FIRST PREGNANCY IN THIRD TRIMESTER: ICD-10-CM

## 2022-05-06 RX ORDER — ESCITALOPRAM OXALATE 10 MG/1
10 TABLET ORAL DAILY
Qty: 90 TABLET | Refills: 3 | Status: SHIPPED | OUTPATIENT
Start: 2022-05-06 | End: 2023-06-13

## 2022-06-08 ENCOUNTER — OFFICE VISIT (OUTPATIENT)
Dept: OBGYN | Facility: CLINIC | Age: 32
End: 2022-06-08
Attending: OBSTETRICS & GYNECOLOGY
Payer: COMMERCIAL

## 2022-06-08 VITALS
SYSTOLIC BLOOD PRESSURE: 127 MMHG | HEART RATE: 75 BPM | BODY MASS INDEX: 24.64 KG/M2 | HEIGHT: 66 IN | WEIGHT: 153.3 LBS | DIASTOLIC BLOOD PRESSURE: 85 MMHG

## 2022-06-08 DIAGNOSIS — Z30.430 ENCOUNTER FOR INSERTION OF INTRAUTERINE CONTRACEPTIVE DEVICE: ICD-10-CM

## 2022-06-08 PROCEDURE — G0463 HOSPITAL OUTPT CLINIC VISIT: HCPCS

## 2022-06-08 PROCEDURE — 99207 PR POST PARTUM EXAM: CPT | Performed by: OBSTETRICS & GYNECOLOGY

## 2022-06-08 PROCEDURE — 250N000011 HC RX IP 250 OP 636: Performed by: OBSTETRICS & GYNECOLOGY

## 2022-06-08 PROCEDURE — 58300 INSERT INTRAUTERINE DEVICE: CPT | Performed by: OBSTETRICS & GYNECOLOGY

## 2022-06-08 RX ADMIN — LEVONORGESTREL 20 MCG: 52 INTRAUTERINE DEVICE INTRAUTERINE at 08:39

## 2022-06-08 NOTE — NURSING NOTE
SUBJECTIVE:   Soraida Johnson is here for her 6-week postpartum checkup.     PHQ-9 score:    Hx of Abuse:  No    Delivery Date: 2022.    Delivering provider:  Jeanne Cifuentes MD.    Type of delivery:  .  Perineum:  tear, with repair.     Delivery complications: None  Infant gender:  boy, weight 7 pounds 13 oz.  Feeding Method:  Bottlefed.  Complications reported with feeding:  none, infant thriving .    Bleeding:  None.  Duration:  3weeks.  Menses resumed:  not sure  Bowel/Urinary problems:  No    Contraception Planned:  IUD Mirena  She  has not had intercourse since delivery..

## 2022-06-08 NOTE — LETTER
"2022       RE: Soraida Johnson  [address]  [facility] [place] [facility] [address]     Dear Colleague,    Thank you for referring your patient, Soraida Johnson, to the [facility] [place] at [facility]. Please see a copy of my visit note below.    SUBJECTIVE   Soraida Johnson is a 31 year old  postpartum from 22  delivery.      Difficulty with supply, Michael has good latch, just low production... now doing 50/50 formula & donated breastmilk via bottle.      Exhausted but overall doing ok... mood doing ok too.  No concerns about perineum healing, desires LNG IUD today.     Medications  Current Outpatient Medications   Medication     escitalopram (LEXAPRO) 10 MG tablet     Prenatal Vit-DSS-Fe Cbn-FA (PRENATAL AD PO)     No current facility-administered medications for this visit.     Allergies   No Known Allergies      Review of Systems  C: NEGATIVE for fever, chills, unplanned weight loss  HEENT: NEGATIVE for vision changes, NEGATIVE for ear, mouth and throat problems  R: NEGATIVE for significant cough or SOB  B: NEGATIVE for masses, tenderness or discharge  CV: NEGATIVE for chest pain, palpitations   GI: NEGATIVE for nausea, abdominal pain, heartburn, or change in bowel habits  : NEGATIVE for frequency, dysuria, or hematuria, or change in bladder habits  M: NEGATIVE for significant arthralgias or myalgia  N: NEGATIVE for weakness, dizziness or paresthesias or headache  E: NEGATIVE for temperature intolerance, skin/hair changes  I: NEGATIVE for worrisome rashes, moles or lesions  P: NEGATIVE for changes in mood or affect    OBJECTIVE   /85 (BP Location: Left arm, Patient Position: Chair)   Pulse 75   Ht 1.676 m (5' 6\")   Wt 69.5 kg (153 lb 4.8 oz)   LMP 2021   BMI 24.74 kg/m    BMI: Body mass index is 24.74 kg/m .  General:  Alert, no distress   Head:  Normocephalic, without obvious abnormality "   Lungs:  Clear to auscultation bilaterally   Heart:  Regular rate and rhythm, no murmur   Abdomen:  Soft, non-tender, non-distended, bowel sounds normal   Pelvic: -nefg  -bladder wnl, well supported  -vagina normal without discharge  -cervix normal in appearance, no masses  -anus wnl, digital exam deferred   Extremities:  normal     ASSESSMENT   Soraida Johnson is a 31 year old , postpartum.    PLAN     -- Return to fertility reviewed.  Patient plans LNG IUD 52 mg for contraception (see below).  -- Return as needed or at time interval for next routine pap, pelvic, or breast exam.  -- Continue a multi vitamin supplement.    RTC 1 year or prn     Fay Alatorre MD MPH    PROCEDURE NOTE: INTRAUTERINE CONTRACEPTION INSERTION     2022    PREOPERATIVE DIAGNOSIS: Desires LARC    POSTOPERATIVE DIAGNOSIS: Same    PROCEDURE PERFORMED: Mirena IUC insertion    ANESTHESIA: none     CONSENT: Her questions were answered.  She was informed about the risks, benefits and alternatives to Mirena insertion.  She understands potential changes in bleeding pattern and the following risks: expulsion (complete or partial), infection, perforation, pregnancy, ectopic pregnancy and the possibility for the IUD to become embedded in the uterus.    Written informed consent was obtained and scanned into the medical record.  Patient received and verbalized understanding of discharge instrcutions.    PROCEDURE DETAILS:   The patient was placed in the dorsal lithotomy position. A speculum was inserted in the vagina, and the cervix visualized and was swabbed with betadine.    A single-toothed tenaculum was applied to the anterior lip of the cervix for stabilization. The uterus sounded to a depth of 7 cm.     Mirena IUD placed without difficulty, strings trimmed to 2.5 cm.   LOT # [id number]   EXP date: 2024   Expected removal date: 2029     EBL: 5 mL  Complications: none noted  Patient tolerated the procedure well.    PLAN:   --  Post-operative instructions reviewed including symptoms of complications  -- IUD identification card given to patient  -- OTC ibuprofen as needed for mild to moderate pain (ibuprofen 400-800 mg PO TID PRN for cramping)   --Follow up prn    Fay Alatorre MD, MPH                                           Again, thank you for allowing me to participate in the care of your patient.      Sincerely,    Eli Alatorre MD

## 2022-06-09 NOTE — PROGRESS NOTES
"SUBJECTIVE   Soraida Johnson is a 31 year old  postpartum from 22  delivery.      Difficulty with supply, Michael has good latch, just low production... now doing 50/50 formula & donated breastmilk via bottle.      Exhausted but overall doing ok... mood doing ok too.  No concerns about perineum healing, desires LNG IUD today.     Medications  Current Outpatient Medications   Medication     escitalopram (LEXAPRO) 10 MG tablet     Prenatal Vit-DSS-Fe Cbn-FA (PRENATAL AD PO)     No current facility-administered medications for this visit.     Allergies   No Known Allergies      Review of Systems  C: NEGATIVE for fever, chills, unplanned weight loss  HEENT: NEGATIVE for vision changes, NEGATIVE for ear, mouth and throat problems  R: NEGATIVE for significant cough or SOB  B: NEGATIVE for masses, tenderness or discharge  CV: NEGATIVE for chest pain, palpitations   GI: NEGATIVE for nausea, abdominal pain, heartburn, or change in bowel habits  : NEGATIVE for frequency, dysuria, or hematuria, or change in bladder habits  M: NEGATIVE for significant arthralgias or myalgia  N: NEGATIVE for weakness, dizziness or paresthesias or headache  E: NEGATIVE for temperature intolerance, skin/hair changes  I: NEGATIVE for worrisome rashes, moles or lesions  P: NEGATIVE for changes in mood or affect    OBJECTIVE   /85 (BP Location: Left arm, Patient Position: Chair)   Pulse 75   Ht 1.676 m (5' 6\")   Wt 69.5 kg (153 lb 4.8 oz)   LMP 2021   BMI 24.74 kg/m    BMI: Body mass index is 24.74 kg/m .  General:  Alert, no distress   Head:  Normocephalic, without obvious abnormality   Lungs:  Clear to auscultation bilaterally   Heart:  Regular rate and rhythm, no murmur   Abdomen:  Soft, non-tender, non-distended, bowel sounds normal   Pelvic: -nefg  -bladder wnl, well supported  -vagina normal without discharge  -cervix normal in appearance, no masses  -anus wnl, digital exam deferred   Extremities:  " normal     ASSESSMENT   Soraida Johnson is a 31 year old , postpartum.    PLAN     -- Return to fertility reviewed.  Patient plans LNG IUD 52 mg for contraception (see below).  -- Return as needed or at time interval for next routine pap, pelvic, or breast exam.  -- Continue a multi vitamin supplement.    RTC 1 year or prn     Fay Alatorre MD MPH    PROCEDURE NOTE: INTRAUTERINE CONTRACEPTION INSERTION     2022    PREOPERATIVE DIAGNOSIS: Desires LARC    POSTOPERATIVE DIAGNOSIS: Same    PROCEDURE PERFORMED: Mirena IUC insertion    ANESTHESIA: none     CONSENT: Her questions were answered.  She was informed about the risks, benefits and alternatives to Mirena insertion.  She understands potential changes in bleeding pattern and the following risks: expulsion (complete or partial), infection, perforation, pregnancy, ectopic pregnancy and the possibility for the IUD to become embedded in the uterus.    Written informed consent was obtained and scanned into the medical record.  Patient received and verbalized understanding of discharge instrcutions.    PROCEDURE DETAILS:   The patient was placed in the dorsal lithotomy position. A speculum was inserted in the vagina, and the cervix visualized and was swabbed with betadine.    A single-toothed tenaculum was applied to the anterior lip of the cervix for stabilization. The uterus sounded to a depth of 7 cm.     Mirena IUD placed without difficulty, strings trimmed to 2.5 cm.   LOT # [id number]   EXP date: 2024   Expected removal date: 2029     EBL: 5 mL  Complications: none noted  Patient tolerated the procedure well.    PLAN:   -- Post-operative instructions reviewed including symptoms of complications  -- IUD identification card given to patient  -- OTC ibuprofen as needed for mild to moderate pain (ibuprofen 400-800 mg PO TID PRN for cramping)   --Follow up prn    Fay Alatorre MD, MPH

## 2022-06-13 ENCOUNTER — MEDICAL CORRESPONDENCE (OUTPATIENT)
Dept: HEALTH INFORMATION MANAGEMENT | Facility: CLINIC | Age: 32
End: 2022-06-13
Payer: COMMERCIAL

## 2022-07-26 ENCOUNTER — TELEPHONE (OUTPATIENT)
Dept: ONCOLOGY | Facility: CLINIC | Age: 32
End: 2022-07-26

## 2022-07-26 DIAGNOSIS — Z11.1 TUBERCULOSIS SCREENING: Primary | ICD-10-CM

## 2022-07-27 ENCOUNTER — LAB (OUTPATIENT)
Dept: LAB | Facility: CLINIC | Age: 32
End: 2022-07-27
Payer: COMMERCIAL

## 2022-07-27 DIAGNOSIS — Z11.1 TUBERCULOSIS SCREENING: ICD-10-CM

## 2022-07-27 PROCEDURE — 86481 TB AG RESPONSE T-CELL SUSP: CPT

## 2022-07-27 PROCEDURE — 36415 COLL VENOUS BLD VENIPUNCTURE: CPT

## 2022-07-29 LAB
GAMMA INTERFERON BACKGROUND BLD IA-ACNC: 0 IU/ML
M TB IFN-G BLD-IMP: NEGATIVE
M TB IFN-G CD4+ BCKGRND COR BLD-ACNC: 10 IU/ML
MITOGEN IGNF BCKGRD COR BLD-ACNC: 0 IU/ML
MITOGEN IGNF BCKGRD COR BLD-ACNC: 0.01 IU/ML
QUANTIFERON MITOGEN: 10 IU/ML
QUANTIFERON NIL TUBE: 0 IU/ML
QUANTIFERON TB1 TUBE: 0.01 IU/ML
QUANTIFERON TB2 TUBE: 0

## 2022-08-03 ENCOUNTER — MEDICAL CORRESPONDENCE (OUTPATIENT)
Dept: HEALTH INFORMATION MANAGEMENT | Facility: CLINIC | Age: 32
End: 2022-08-03

## 2022-09-18 ENCOUNTER — HEALTH MAINTENANCE LETTER (OUTPATIENT)
Age: 32
End: 2022-09-18

## 2022-11-02 ENCOUNTER — MEDICAL CORRESPONDENCE (OUTPATIENT)
Dept: HEALTH INFORMATION MANAGEMENT | Facility: CLINIC | Age: 32
End: 2022-11-02

## 2023-01-27 DIAGNOSIS — B37.31 VULVOVAGINAL CANDIDIASIS: Primary | ICD-10-CM

## 2023-01-27 RX ORDER — FLUCONAZOLE 150 MG/1
150 TABLET ORAL ONCE
Qty: 2 TABLET | Refills: 0 | Status: SHIPPED | OUTPATIENT
Start: 2023-01-27 | End: 2023-01-27

## 2023-03-07 PROBLEM — Z34.00 SUPERVISION OF NORMAL FIRST PREGNANCY: Status: RESOLVED | Noted: 2021-10-20 | Resolved: 2023-03-07

## 2023-03-07 PROBLEM — O13.9 GESTATIONAL HYPERTENSION: Status: RESOLVED | Noted: 2022-04-27 | Resolved: 2023-03-07

## 2023-03-07 NOTE — PROGRESS NOTES
"CC: Physical (New, also talk about cramping and abnormal vaginal bleeding x few weeks )      Soraida is a 32 year old female who presents to clinic for an annual exam.       We reviewed and updated her medication list as necessary. Her past medical and surgical history as well as her family history were reviewed and updated as necessary.    Gestational HTN - checks at home and normal. Does think she has a bit of white coat HTN.     Depression - esictalopram 10 mg daily working well, started post-partum.      Gynecological history:  Was having amenorrhea with Mirena IUD placed 2022 but the last 1-2 weeks started having very light bleeding and some cramping, nothing severe, not needing any medications for the cramping    Last Pap:  2019, result Normal  History of abnormal Paps: Yes in her early 20s, normal on follow-up testing  Concern for STIs: no  Contraceptive method: Mirena IUD placed 2022  OB history:     Other health-related habits:  Physical activity: Peloton and running and yoga  Tobacco: None    Alcohol: A few drinks/week  Vaccines: UTD  Hep C & HIV screenin2019  DM screening: had bloodwork for disability insurance and normal    Lipids: had bloodwork for disability insurance and normal        GynOnc at [facility]. . Almost 1 year old son.     OBJECTIVE:   BP (!) 145/89 (BP Location: Right arm, Patient Position: Sitting, Cuff Size: Adult Regular)   Pulse 66   Temp 97.4  F (36.3  C) (Skin)   Resp 15   Ht 1.702 m (5' 7\")   Wt 70.8 kg (156 lb)   SpO2 100%   Breastfeeding No   BMI 24.43 kg/m     General: Healthy female in NAD.  Cooperative and pleasant.  HEENT: Normocephalic, atraumatic. Oropharynx moist without lesions or exudate.  TMs normal. Supple neck, without lymphadenopathy or thyromegaly.    Breasts: No obvious masses, axillary adenopathy or fibrocystic changes.    Lungs: CTA bilaterally.  CV: RRR, normal S1 and S2, without murmurs, rubs, or gallops appreciated.  "   Abdomen: Soft, NT, ND.  No masses or hepatosplenomegaly appreciated.    Gyn: Normal appearing external genitalia without lesion.  Vaginal mucosa pink and moist.  Cervix visualized and IUD strings approx 3 cm from os. Blood visible in vaginal vault.   Extremities: WWW, without deformity, edema.  Skin: Clear without lesions or rashes.   Neuro: Grossly intact, nonfocal.  Psych: Mood and affect appropriate.      ASSESSMENT AND PLAN:   Soraida was seen today for physical.    Diagnoses and all orders for this visit:    Routine general medical examination at a health care facility    IUD check up    Elevated BP without diagnosis of hypertension    Check BP at home - send a [facility] [facility] message with home BP readings.   IUD strings visualized. Monitor symptoms - may be normal menstrual cycle but if bleeding increases or not improving then recommend pelvic US for further evaluation.   Escitalopram 10 mg daily working well for depression -- continue medications and will reach out if she needs a refill.   HM otherwise UTD.   Return in about 1 year (around 3/8/2024) for physical.      Ania Mcclendon MD  Family Medicine

## 2023-03-08 ENCOUNTER — OFFICE VISIT (OUTPATIENT)
Dept: FAMILY MEDICINE | Facility: CLINIC | Age: 33
End: 2023-03-08
Payer: COMMERCIAL

## 2023-03-08 VITALS
SYSTOLIC BLOOD PRESSURE: 145 MMHG | HEIGHT: 67 IN | TEMPERATURE: 97.4 F | DIASTOLIC BLOOD PRESSURE: 89 MMHG | HEART RATE: 66 BPM | RESPIRATION RATE: 15 BRPM | BODY MASS INDEX: 24.48 KG/M2 | WEIGHT: 156 LBS | OXYGEN SATURATION: 100 %

## 2023-03-08 DIAGNOSIS — R03.0 ELEVATED BP WITHOUT DIAGNOSIS OF HYPERTENSION: ICD-10-CM

## 2023-03-08 DIAGNOSIS — Z00.00 ROUTINE GENERAL MEDICAL EXAMINATION AT A HEALTH CARE FACILITY: Primary | ICD-10-CM

## 2023-03-08 DIAGNOSIS — Z30.431 IUD CHECK UP: ICD-10-CM

## 2023-03-08 NOTE — NURSING NOTE
"32 year old  Chief Complaint   Patient presents with     Physical     New, also talk about cramping and abnormal vaginal bleeding x few weeks        Blood pressure (!) 145/89, pulse 66, temperature 97.4  F (36.3  C), temperature source Skin, resp. rate 15, height 1.702 m (5' 7\"), weight 70.8 kg (156 lb), SpO2 100 %, unknown if currently breastfeeding. Body mass index is 24.43 kg/m .  Patient Active Problem List   Diagnosis   (none) - all problems resolved or deleted       Wt Readings from Last 2 Encounters:   03/08/23 70.8 kg (156 lb)   06/08/22 69.5 kg (153 lb 4.8 oz)     BP Readings from Last 3 Encounters:   03/08/23 (!) 145/89   06/08/22 127/85   04/29/22 124/89         Current Outpatient Medications   Medication     escitalopram (LEXAPRO) 10 MG tablet     levonorgestrel (MIRENA) 20 MCG/DAY IUD     Prenatal Vit-DSS-Fe Cbn-FA (PRENATAL AD PO)     No current facility-administered medications for this visit.       Social History     Tobacco Use     Smoking status: Never     Smokeless tobacco: Never   Substance Use Topics     Alcohol use: Yes     Comment: a couple per week     Drug use: Never       Health Maintenance Due   Topic Date Due     ADVANCE CARE PLANNING  Never done     HEPATITIS B IMMUNIZATION (1 of 3 - 3-dose series) Never done     YEARLY PREVENTIVE VISIT  08/02/2020       Lab Results   Component Value Date    PAP NIL 08/02/2019 March 8, 2023 1:55 PM    "

## 2023-04-04 ENCOUNTER — MYC MEDICAL ADVICE (OUTPATIENT)
Dept: FAMILY MEDICINE | Facility: CLINIC | Age: 33
End: 2023-04-04

## 2023-04-04 DIAGNOSIS — R10.32 LLQ ABDOMINAL PAIN: Primary | ICD-10-CM

## 2023-04-05 ENCOUNTER — ANCILLARY PROCEDURE (OUTPATIENT)
Dept: CT IMAGING | Facility: CLINIC | Age: 33
End: 2023-04-05
Attending: FAMILY MEDICINE
Payer: COMMERCIAL

## 2023-04-05 DIAGNOSIS — R10.32 LLQ ABDOMINAL PAIN: ICD-10-CM

## 2023-04-05 PROCEDURE — 74177 CT ABD & PELVIS W/CONTRAST: CPT | Performed by: RADIOLOGY

## 2023-04-05 RX ORDER — IOPAMIDOL 755 MG/ML
87 INJECTION, SOLUTION INTRAVASCULAR ONCE
Status: COMPLETED | OUTPATIENT
Start: 2023-04-05 | End: 2023-04-05

## 2023-04-05 RX ADMIN — IOPAMIDOL 87 ML: 755 INJECTION, SOLUTION INTRAVASCULAR at 09:26

## 2023-04-19 ENCOUNTER — OFFICE VISIT (OUTPATIENT)
Dept: FAMILY MEDICINE | Facility: CLINIC | Age: 33
End: 2023-04-19
Payer: COMMERCIAL

## 2023-04-19 VITALS
WEIGHT: 155.12 LBS | BODY MASS INDEX: 24.35 KG/M2 | SYSTOLIC BLOOD PRESSURE: 124 MMHG | DIASTOLIC BLOOD PRESSURE: 80 MMHG | OXYGEN SATURATION: 98 % | HEART RATE: 72 BPM | TEMPERATURE: 98 F | HEIGHT: 67 IN
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
## 2023-04-19 DIAGNOSIS — M54.9 ACUTE UPPER BACK PAIN: ICD-10-CM

## 2023-04-19 ASSESSMENT — PAIN SCALES - GENERAL: PAINLEVEL: MODERATE PAIN (4)
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
## 2023-06-11 ENCOUNTER — MYC MEDICAL ADVICE (OUTPATIENT)
Dept: FAMILY MEDICINE | Facility: CLINIC | Age: 33
End: 2023-06-11

## 2023-06-11 DIAGNOSIS — F32.A MILD DEPRESSION: ICD-10-CM

## 2023-06-13 RX ORDER — ESCITALOPRAM OXALATE 10 MG/1
10 TABLET ORAL DAILY
Qty: 90 TABLET | Refills: 3 | Status: SHIPPED | OUTPATIENT
Start: 2023-06-13 | End: 2023-06-21

## 2023-06-13 NOTE — TELEPHONE ENCOUNTER
Medication requested: escitalopram (LEXAPRO) 10 MG tablet  Last office visit: 4/19/2023  [facility] appointments: none  Medication last refilled: 5/6/2022; 90 + 3 refills  Last qualifying labs:      8/2/2019  1:14 PM   PHQ-9 and GAD7 Scores    PHQ-9 Total Score 0    EMILI-7 Total Score 0      Prescription approved per [facility] Refill Protocol.    MC message sent to pt with PHQ-9 questionnaire attached for her to fill out and send back.    WENDY Morales, RN  06/13/23, 11:26 AM

## 2023-06-21 ENCOUNTER — MYC MEDICAL ADVICE (OUTPATIENT)
Dept: FAMILY MEDICINE | Facility: CLINIC | Age: 33
End: 2023-06-21

## 2023-06-21 DIAGNOSIS — F32.A MILD DEPRESSION: ICD-10-CM

## 2023-06-21 RX ORDER — ESCITALOPRAM OXALATE 5 MG/1
5 TABLET ORAL DAILY
Qty: 90 TABLET | Refills: 3 | Status: ON HOLD | OUTPATIENT
Start: 2023-06-21 | End: 2024-05-26

## 2023-09-28 ENCOUNTER — TELEPHONE (OUTPATIENT)
Dept: OBGYN | Facility: CLINIC | Age: 33
End: 2023-09-28
Payer: COMMERCIAL

## 2023-09-28 DIAGNOSIS — Z34.90 SUPERVISION OF NORMAL PREGNANCY: Primary | ICD-10-CM

## 2023-10-10 ENCOUNTER — ANCILLARY PROCEDURE (OUTPATIENT)
Dept: ULTRASOUND IMAGING | Facility: CLINIC | Age: 33
End: 2023-10-10
Attending: OBSTETRICS & GYNECOLOGY
Payer: COMMERCIAL

## 2023-10-10 DIAGNOSIS — O21.9 VOMITING OR NAUSEA OF PREGNANCY: Primary | ICD-10-CM

## 2023-10-10 DIAGNOSIS — Z34.90 SUPERVISION OF NORMAL PREGNANCY: ICD-10-CM

## 2023-10-10 PROCEDURE — 76817 TRANSVAGINAL US OBSTETRIC: CPT

## 2023-10-10 PROCEDURE — 76817 TRANSVAGINAL US OBSTETRIC: CPT | Mod: 26 | Performed by: STUDENT IN AN ORGANIZED HEALTH CARE EDUCATION/TRAINING PROGRAM

## 2023-10-10 RX ORDER — PYRIDOXINE HCL (VITAMIN B6) 25 MG
25 TABLET ORAL DAILY
Qty: 60 TABLET | Refills: 4 | Status: SHIPPED | OUTPATIENT
Start: 2023-10-10

## 2023-10-25 ENCOUNTER — TRANSCRIBE ORDERS (OUTPATIENT)
Dept: MATERNAL FETAL MEDICINE | Facility: CLINIC | Age: 33
End: 2023-10-25
Payer: COMMERCIAL

## 2023-10-25 DIAGNOSIS — O26.90 PREGNANCY RELATED CONDITION, ANTEPARTUM: Primary | ICD-10-CM

## 2023-10-25 DIAGNOSIS — Z34.81 ENCOUNTER FOR SUPERVISION OF OTHER NORMAL PREGNANCY IN FIRST TRIMESTER: Primary | ICD-10-CM

## 2023-11-07 ENCOUNTER — PRE VISIT (OUTPATIENT)
Dept: MATERNAL FETAL MEDICINE | Facility: CLINIC | Age: 33
End: 2023-11-07
Payer: COMMERCIAL

## 2023-11-10 NOTE — PROGRESS NOTES
[facility]  Genetic Counseling Consult    Patient:  Soraida Johnson YOB: 1990   Date of Service:  11/13/23   MRN: [id number]    Soraida was seen at the [facility] for genetic consultation. The indication for genetic counseling is desire to discuss options for genetic screening and diagnostics. The patient was accompanied to this visit by their , Surya.    The session was conducted in English.      IMPRESSION/ PLAN   1. Soraida has not had genetic screening in this pregnancy but elected to have screening today.     2. During today's [facility] visit, Soraida had a blood draw for carrier screening. Results are expected in 2-3 weeks. The patient will be called with results and if they do not answer they requested a detailed message with results on their voicemail.Patient was informed that results will be available in Socialize. Soraida also had a blood draw for expanded non-invasive prenatal testing (also called NIPT, NIPS, or cell-free DNA) through [facility]. The expanded NIPT screens for trisomy 21, 18, and 13 and 22q11.2 deletion syndrome. The patient opted to screen for sex chromosome aneuploidies, but declines reporting of fetal sex.  In accordance with current guidelines, other microdeletion syndromes and rare autosomal trisomies were not included, but reflex to include these conditions remains available with expanded NIPT if there is an indication later in the pregnancy. Results are expected in 7-14 days. The patient will be called with results and if they do not answer they requested a detailed message with results on their voicemail.  Patient was informed that results will be available in Socialize. They are aware if the result is abnormal for a sex chromosome aneuploidy, disclosure of that result will reveal the predicted sex, even though they choose to not report fetal sex (male or female) on the report.  "    3. Soraida had a nuchal translucency ultrasound today. Please see the ultrasound report for further details.    4. Further recommendations include a fetal anatomy level II ultrasound with MFM. The upcoming ultrasound has been scheduled for 2024.    PREGNANCY HISTORY   /Parity:       Soraida's pregnancy history is significant for:   2022: 39w1d, , male    CURRENT PREGNANCY   Current Age: 33 year old     Age at Delivery: 33 year old    DYLAN: 2024, by Last Menstrual Period                                     Gestational Age: 11w1d    This pregnancy is a single gestation.     This pregnancy was conceived spontaneously.    MEDICAL HISTORY   Soraida s reported medical history is not expected to impact pregnancy management or risks to fetal development.    FAMILY HISTORY   A three-generation pedigree was obtained previously by a genetic counselor on 10/18/2021 and updated today. The original and updated version is scanned under the \"Media\" tab in Epic.  There were no significant updates today. Their son, Michael, is doing well.       RISK ASSESSMENT FOR CHROMOSOME CONDITIONS   We explained that the risk for fetal chromosome abnormalities increases with maternal age. We discussed specific features of common chromosome abnormalities, including Down syndrome, trisomy 13, trisomy 18, and sex chromosome trisomies.    At age 33 at midtrimester, the risk to have a baby with Down syndrome is 1 in 342.   At age 33 at midtrimester, the risk to have a baby with any chromosome abnormality is 1 in  172.     Soraida has not had genetic screening in this pregnancy but elected to have screening today.      RISK ASSESSMENT FOR INHERITED CONDITIONS   We discussed that every pregnancy has a chance to have an inherited single-gene condition, even when there is no family history of that condition. In fact, approximately 90% of couples at an increased reproductive risk for an inherited condition have no family " history of that condition. The average person may be a carrier for 5-10 different genetic variants that can increase the chance for their pregnancies to have that condition. We discussed autosomal recessive conditions and X-linked conditions. Autosomal recessive conditions happen when a mutation has been inherited from the egg and sperm and include conditions like cystic fibrosis, thalassemia, hearing loss, spinal muscular atrophy, and more. X-linked conditions happen when a mutation has been inherited from the egg and include conditions like fragile X syndrome.     We reviewed that when both biological parents carry a harmful genetic change in a gene associated with autosomal recessive inheritance, each of their pregnancies has a 1 in 4 (25%) chance to be affected by that condition. With x-linked conditions, the specific risk generally depends on the chromosomal sex of the fetus, with XY individuals (generally male) being most severely affected.      screening was reviewed. About [place] Screening    The patient does NOT have a family history of known inherited conditions. This does NOT mean the patient and/or their partner is not a carrier of a condition. Approximately 90% of couples at an increased reproductive risk for an inherited condition have no family history of that condition. . The patient has not had carrier screening previously. The patient and their partner pursued carrier screening today. Her screening will include 558 genes, including fragile X syndrome, through [facility]. See below for the more detailed information we discussed. See separate documentation in partners chart.     We discussed that expanded carrier screening is designed to identify carrier status for conditions that are primarily childhood or adolescent onset. Expanded carrier screening does not evaluate for adult-onset conditions such as hereditary cancer syndromes, dementia/ Alzheimer's disease, or cardiovascular  disease risk factors. Additionally, expanded carrier screening is not comprehensive for all known genetic diseases or inherited conditions. It will not intentionally screen for autosomal dominant conditions. This is a screening test, and residual carrier status risk figures will be provided to the patient after results become available. Carrier screening is not meant to diagnose the patient with a condition, and generally carriers are asymptomatic. However, certain genes may confer increased risks for various health concerns in carriers (including, but not limited to: DAVID, DMD, FMR1).    We discussed that carrier screening can have implications for other family members and that couples are encouraged to share positive results with siblings and other family members of reproductive age. Additionally, even if there is not a high reproductive risk for a condition, it is possible that carrier status can be passed on to future generations.    We reviewed that there is a law in place, the Genetic Information Nondiscrimination Act (TON), that protects patients from discrimination by health insurance companies and employers based on their genetic information. [place] does not protect against discrimination by life insurance companies or disability insurance.    We reviewed that carrier screening will report on variants classified by the lab as pathogenic or likely pathogenic. Although carrier status does not change over time, it is possible that a variant could be reclassified as more information about the variant is learned. If this occurs, the couple will be contacted and a new risk assessment will be provided.     GENETIC TESTING OPTIONS   Genetic testing during a pregnancy includes screening and diagnostic procedures.      Screening tests are non-invasive which means no risk to the pregnancy and includes ultrasounds and blood work. The benefits and limitations of screening were reviewed. Screening tests provide a risk  assessment (chance) specific to the pregnancy for certain fetal chromosome abnormalities but cannot definitively diagnose or exclude a fetal chromosome abnormality. Follow-up genetic counseling and consideration of diagnostic testing is recommended with any abnormal screening result. Diagnostic testing during a pregnancy is more certain and can test for more conditions. However, the tests do have a risk of miscarriage that requires careful consideration. These tests can detect fetal chromosome abnormalities with greater than 99% certainty. Results can be compromised by maternal cell contamination or mosaicism and are limited by the resolution of current genetic testing technology.     There is no screening or diagnostic test that detects all forms of birth defects or intellectual disability.     We discussed the following screening options:     Non-invasive prenatal testing (NIPT)  Also called cell-free DNA screening because it detect chromosome fragments from the placenta in the pregnant person's blood.  Can be done any time after 10 weeks gestation.  Screens for trisomy 21, trisomy 18, trisomy 13, and sex chromosome aneuploidies. ACMG also recommends consideration of screening for 22q11.2 microdeletion syndrome.  Does not screen for all known chromosomal conditions.  Even with negative results, a residual risk for screened conditions remains.  Cannot screen for open neural tube defects, maternal serum AFP after 15 weeks is recommended    We also discussed that current ACMG guidelines recommend that screening for 22q11.2 deletion syndrome be offered to all pregnant patients. 22q11.2 deletion syndrome has an estimated prevalence of 1 in 990 to 1 in 2148 (0.05-0.1%). Risk is not thought to increase with maternal age. Clinical features are variable but include congenital heart defects, cleft palate, developmental delays, immune system deficiencies, and hearing loss. Approximately 90% of cases are de will (a sporadic  new change in a pregnancy). Cell-free DNA screening for 22q11.2 deletion syndrome is available (Expanded NIPS through [facility]). We discussed the limitations of cell-free DNA screening in detecting microdeletions and the possiblity of false positives and false negatives. Expanded NIPS also allows for reflex to include other microdeletion conditions and rare autosomal trisomies if an indication would arise later in the pregnancy.     Carrier screening  Risk assessment for certain autosomal recessive and x-linked conditions. These conditions are generally infantile- or childhood-onset conditions.   Can be done any time during the pregnancy or prior to pregnancy.  Can screen for over 500 different genetic conditions.  Is not intended to diagnosis a condition in the carrier parent.    Even with negative results, a residual risk for screened conditions remains.      We discussed the following ultrasound options:  Nuchal translucency (NT) ultrasound  Ultrasound between [id number]-98w0r that includes nuchal translucency measurement and nasal bone assessments  Nuchal translucency refers to the space at the back of the neck where fluid builds up. All babies at this stage have fluid and there is only concern if there is too much fluid  Nasal bone refers to the small bone in the nose. There is concern for conditions like Down syndrome if the bone cannot be seen at all  This ultrasound can be done as part of first trimester screening, at the same time as another screen (NIPT), at the same time as a CVS, or if the patients does not want genetic screening.  Markers on ultrasound detects about 70% of pregnancies with aneuploidy  Abnormalities on NT ultrasound can also increase the risk for a birth defect, like a heart defect    We discussed the following diagnostic options:   Chorionic villus sampling (CVS)  Invasive diagnostic procedure done between 10w0d and 13w6d  The procedure collects a small sample from the placenta for  the purpose of chromosomal testing and/or other genetic testing  Diagnostic result; more than 99% sensitivity for fetal chromosome abnormalities  Cannot screen for open neural tube defects, maternal serum AFP after 15 weeks is recommended  Amniocentesis  Invasive diagnostic procedure done after 15 weeks gestation  The procedure collects a small sample of amniotic fluid for the purpose of chromosomal testing and/or other genetic testing  Diagnostic result; more than 99% sensitivity for fetal chromosome abnormalities  Testing for AFP in the amniotic fluid can test for open neural tube defects      It was a pleasure to be involved with Soraida s care. Face-to-face time of the meeting was 45 minutes.      Renzo Jacobson MS, [facility]  Board Certified and [place] Licensed Genetic Counselor  [facility]  Maternal Fetal Medicine  Office: [phone]  Pager: [phone]  [facility]: [phone]   Fax: [phone]  [facility]

## 2023-11-12 LAB
ABO/RH(D): NORMAL
ANTIBODY SCREEN: NEGATIVE
SPECIMEN EXPIRATION DATE: NORMAL

## 2023-11-13 ENCOUNTER — LAB (OUTPATIENT)
Dept: LAB | Facility: CLINIC | Age: 33
End: 2023-11-13
Attending: OBSTETRICS & GYNECOLOGY
Payer: COMMERCIAL

## 2023-11-13 ENCOUNTER — OFFICE VISIT (OUTPATIENT)
Dept: MATERNAL FETAL MEDICINE | Facility: CLINIC | Age: 33
End: 2023-11-13
Attending: OBSTETRICS & GYNECOLOGY
Payer: COMMERCIAL

## 2023-11-13 ENCOUNTER — HOSPITAL ENCOUNTER (OUTPATIENT)
Dept: ULTRASOUND IMAGING | Facility: CLINIC | Age: 33
Discharge: HOME | End: 2023-11-13
Attending: OBSTETRICS & GYNECOLOGY
Payer: COMMERCIAL

## 2023-11-13 DIAGNOSIS — Z36.9 FIRST TRIMESTER SCREENING: Primary | ICD-10-CM

## 2023-11-13 DIAGNOSIS — Z34.90 SUPERVISION OF NORMAL PREGNANCY: ICD-10-CM

## 2023-11-13 DIAGNOSIS — O26.90 PREGNANCY RELATED CONDITION, ANTEPARTUM: ICD-10-CM

## 2023-11-13 DIAGNOSIS — Z34.90 SUPERVISION OF NORMAL PREGNANCY: Primary | ICD-10-CM

## 2023-11-13 DIAGNOSIS — Z31.430 ENCOUNTER OF FEMALE FOR TESTING FOR GENETIC DISEASE CARRIER STATUS FOR PROCREATIVE MANAGEMENT: ICD-10-CM

## 2023-11-13 DIAGNOSIS — Z34.01 ENCOUNTER FOR SUPERVISION OF NORMAL FIRST PREGNANCY IN FIRST TRIMESTER: ICD-10-CM

## 2023-11-13 DIAGNOSIS — Z34.81 ENCOUNTER FOR SUPERVISION OF OTHER NORMAL PREGNANCY IN FIRST TRIMESTER: ICD-10-CM

## 2023-11-13 LAB
ERYTHROCYTE [DISTWIDTH] IN BLOOD BY AUTOMATED COUNT: 12.2 % (ref 10–15)
HBA1C MFR BLD: 5.2 %
HBV SURFACE AB SERPL IA-ACNC: 57.25 M[IU]/ML
HBV SURFACE AB SERPL IA-ACNC: REACTIVE M[IU]/ML
HBV SURFACE AG SERPL QL IA: NONREACTIVE
HCT VFR BLD AUTO: 37.2 % (ref 35–47)
HGB BLD-MCNC: 13.5 G/DL (ref 11.7–15.7)
HIV 1+2 AB+HIV1 P24 AG SERPL QL IA: NONREACTIVE
MCH RBC QN AUTO: 31.7 PG (ref 26.5–33)
MCHC RBC AUTO-ENTMCNC: 36.3 G/DL (ref 31.5–36.5)
MCV RBC AUTO: 87 FL (ref 78–100)
PLATELET # BLD AUTO: 240 10E3/UL (ref 150–450)
RBC # BLD AUTO: 4.26 10E6/UL (ref 3.8–5.2)
T PALLIDUM AB SER QL: NONREACTIVE
WBC # BLD AUTO: 9.5 10E3/UL (ref 4–11)

## 2023-11-13 PROCEDURE — 76813 OB US NUCHAL MEAS 1 GEST: CPT

## 2023-11-13 PROCEDURE — 86901 BLOOD TYPING SEROLOGIC RH(D): CPT

## 2023-11-13 PROCEDURE — 83036 HEMOGLOBIN GLYCOSYLATED A1C: CPT

## 2023-11-13 PROCEDURE — 87340 HEPATITIS B SURFACE AG IA: CPT

## 2023-11-13 PROCEDURE — 82306 VITAMIN D 25 HYDROXY: CPT

## 2023-11-13 PROCEDURE — 36415 COLL VENOUS BLD VENIPUNCTURE: CPT

## 2023-11-13 PROCEDURE — 86850 RBC ANTIBODY SCREEN: CPT

## 2023-11-13 PROCEDURE — 86780 TREPONEMA PALLIDUM: CPT

## 2023-11-13 PROCEDURE — 87389 HIV-1 AG W/HIV-1&-2 AB AG IA: CPT

## 2023-11-13 PROCEDURE — 85027 COMPLETE CBC AUTOMATED: CPT

## 2023-11-13 PROCEDURE — 86762 RUBELLA ANTIBODY: CPT

## 2023-11-13 PROCEDURE — 96040 HC GENETIC COUNSELING, EACH 30 MINUTES: CPT

## 2023-11-13 PROCEDURE — 87086 URINE CULTURE/COLONY COUNT: CPT

## 2023-11-13 PROCEDURE — 86644 CMV ANTIBODY: CPT

## 2023-11-13 PROCEDURE — 76813 OB US NUCHAL MEAS 1 GEST: CPT | Mod: 26 | Performed by: OBSTETRICS & GYNECOLOGY

## 2023-11-13 PROCEDURE — 86747 PARVOVIRUS ANTIBODY: CPT

## 2023-11-13 PROCEDURE — 86706 HEP B SURFACE ANTIBODY: CPT

## 2023-11-14 LAB
BACTERIA UR CULT: NO GROWTH
CMV IGG SERPL IA-ACNC: 0.45 U/ML
CMV IGG SERPL IA-ACNC: NORMAL
RUBV IGG SERPL QL IA: 1.95 INDEX
RUBV IGG SERPL QL IA: POSITIVE
VIT D+METAB SERPL-MCNC: 31 NG/ML (ref 20–50)

## 2023-11-20 ENCOUNTER — TELEPHONE (OUTPATIENT)
Dept: MATERNAL FETAL MEDICINE | Facility: CLINIC | Age: 33
End: 2023-11-20
Payer: COMMERCIAL

## 2023-11-20 DIAGNOSIS — Z34.81 ENCOUNTER FOR SUPERVISION OF OTHER NORMAL PREGNANCY IN FIRST TRIMESTER: Primary | ICD-10-CM

## 2023-11-20 LAB
B19V IGG SER IA-ACNC: 0.46 IV
B19V IGM SER IA-ACNC: 0.31 IV
SCANNED LAB RESULT: NORMAL

## 2023-11-20 NOTE — TELEPHONE ENCOUNTER
November 20, 2023    I spoke with Soraida regarding her NIPT results.     Results indicate NO ANEUPLOIDY DETECTED for chromosomes 21, 18, 13, or sex chromosomes. Screening for 22q11.2 deletion syndrome was also negative.    This puts her current pregnancy at low risk for Down syndrome, trisomy 18, trisomy 13 and sex chromosome abnormalities. This test is reported to have the following sensitivities: Down syndrome: 99.99%, trisomy 18: 99.99%, and trisomy 13: 99.99%. Although these results are reassuring, this does not replace a standard chromosome analysis from a chorionic villus sampling or amniocentesis.     Level II ultrasound is recommended and scheduled for 01/12/2024.    MSAFP is the appropriate second trimester screening test for open neural tube defects; the maternal quad screen is not recommended.    Her results are available in her Epic chart for her primary OB to review.    Renzo Jacobson MS, [facility]  Board Certified and [place] Licensed Genetic Counselor  [facility]  Maternal Fetal Medicine  Office: [phone]  Pager: [phone]  [facility]: [phone]   Fax: [phone]  [facility]

## 2023-11-21 ENCOUNTER — TELEPHONE (OUTPATIENT)
Dept: MATERNAL FETAL MEDICINE | Facility: CLINIC | Age: 33
End: 2023-11-21
Payer: COMMERCIAL

## 2023-11-21 DIAGNOSIS — Z34.81 ENCOUNTER FOR SUPERVISION OF OTHER NORMAL PREGNANCY IN FIRST TRIMESTER: Primary | ICD-10-CM

## 2023-11-21 LAB — SCANNED LAB RESULT: ABNORMAL

## 2023-11-21 NOTE — TELEPHONE ENCOUNTER
Carrier Screening Results Disclosure  [facility] Maternal Fetal Medicine    I left a voicemail for Soraida today to review her carrier screening results (558 gene panel through [facility]). Her partner, Surya, also had carrier screening performed (557 gene panel through [facility]). His screening included the same genes as Soraida's screening other than FMR1, which is associated with the X-linked condition, Fragile X Syndrome. Surya previously signed an authorization to share protected information form to discuss his results with Soraida. Overall, Soraida and Surya were not found to be a carrier for the same condition.     Most of the conditions on the carrier screening panel are inherited in an autosomal recessive fashion. Every individual has two copies of the gene that is responsible for this condition, and if someone has a change or mutation that impacts how one copy of the gene functions, they are called a carrier for the condition.  If someone has two copies of the gene that have a harmful change, they are affected with the condition.  If two people who are carriers for the same condition have children, there is a chance for their children to be affected.  Each parent has a 50% chance for passing on their copy of the gene with a mutation, so there is a 25% chance for each pregnancy to get two copies of the mutation and be affected, a 50% chance for each pregnancy to be an unaffected carrier, and a 25% chance to be unaffected with two normal copies of the gene.    Soraida screened negative for 557 conditions and was found to harbor pathogenic variants in one gene. Surya screened negative for all 557 conditions. Positive results and their respective reproductive risks are outlined below.    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 The age of  onset and severity can vary, though typically presents in the teenage years or earlier.   Symptoms include liver dysfunction, jaundice, and liver failure. Copper deposits in the eye are also common and form a brownish-yellow ring around the iris called a Kayser-Fleischer ring. Other symptoms include kidney and heart disease, weakening of the bones, difficulty walking and speaking, and hemolytic anemia. Psychiatric concerns can also be present.   Maximus disease can be treated and includes chelation therapy to remove copper from the body.     Reproductive Risks    Malik were not found to be carriers of the same conditions. Therefore, their reproductive risk to have a child together with these conditions is greatly reduced. We discussed that a negative result does not completely eliminate the chance to be a carrier. The risk to be a carrier following negative screening is called residual risk.     The residual risk is based on Soraida's carrier status, Surya's carrier status, and the 1 in 4 (25%) chance of both parents passing on the allele with a mutation.    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 Pseudodeficiency alleles are not known to be associated with disease and are not thought to impact the individuals risk to be a carrier for these conditions. However, the presence of pseudodeficiency alleles can exhibit false positive results on enzymatic activity tests (e.g., GALC activity); this is usually never discovered in someone's lifetime except if detected through [place] Screening. GALC deficiency, known as Krabbe disease, is now being added to [place]'s  Screen (). 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 Although this variant has been  associated with late-onset Stargardt disease, the majority of individuals that are homozygous for this variant or compound heterozygous for this variant and another pathogenic variant are unaffected. It may be considered a disease-modifier, but carrier screening for the reproductive partner is not recommended.     Soraida had normal numbers of FMR1 trinucleotide repeats (28, 31).    Familial Screening    Soraida screened positive as a carrier of one autosomal recessive condition. She is not expected to have symptoms. For each respective gene related to these conditions, each child would have a 50% chance to also be a carrier. Genetic counseling for the couple's children is recommended when they are of reproductive age. Soraida's other first degree relatives also have a 50% risk to carry each of these conditions. We discussed sharing this information with family members who could be at risk to be carriers. I am happy to be an information resource for other family members and to help coordinate carrier screening if desired.    No further screening or testing for the couple or a future pregnancy is indicated.  Again, while the chances of the aforementioned conditions are low, because the detection rate of testing is not 100%, if there is ever concern for symptoms in the couple's children in the future, referral to an appropriate practitioner for evaluation is recommended.         It is a pleasure to be involved in Soraida's care.        Renzo Jacobson MS, [facility]  Board Certified and [place] Licensed Genetic Counselor  [facility]  Maternal Fetal Medicine  Office: [phone]  Pager: [phone]  [facility]: [phone]   Fax: [phone]  [facility]

## 2024-01-12 ENCOUNTER — OFFICE VISIT (OUTPATIENT)
Dept: MATERNAL FETAL MEDICINE | Facility: CLINIC | Age: 34
End: 2024-01-12
Attending: OBSTETRICS & GYNECOLOGY
Payer: COMMERCIAL

## 2024-01-12 ENCOUNTER — HOSPITAL ENCOUNTER (OUTPATIENT)
Dept: ULTRASOUND IMAGING | Facility: CLINIC | Age: 34
Discharge: HOME | End: 2024-01-12
Attending: OBSTETRICS & GYNECOLOGY
Payer: COMMERCIAL

## 2024-01-12 DIAGNOSIS — Z36.9 FIRST TRIMESTER SCREENING: ICD-10-CM

## 2024-01-12 DIAGNOSIS — O35.9XX0 SUSPECTED FETAL ANOMALY, ANTEPARTUM, SINGLE OR UNSPECIFIED FETUS: Primary | ICD-10-CM

## 2024-01-12 PROCEDURE — 76811 OB US DETAILED SNGL FETUS: CPT | Mod: 26 | Performed by: OBSTETRICS & GYNECOLOGY

## 2024-01-12 PROCEDURE — 76811 OB US DETAILED SNGL FETUS: CPT

## 2024-01-12 NOTE — PROGRESS NOTES
"Please see \"Imaging\" tab under \"Chart Review\" for details of today's US at the [facility].    Nestor Sanches MD  Maternal-Fetal Medicine    "

## 2024-01-19 ENCOUNTER — OFFICE VISIT (OUTPATIENT)
Dept: MATERNAL FETAL MEDICINE | Facility: CLINIC | Age: 34
End: 2024-01-19
Attending: OBSTETRICS & GYNECOLOGY
Payer: COMMERCIAL

## 2024-01-19 ENCOUNTER — HOSPITAL ENCOUNTER (OUTPATIENT)
Dept: ULTRASOUND IMAGING | Facility: CLINIC | Age: 34
Discharge: HOME | End: 2024-01-19
Attending: OBSTETRICS & GYNECOLOGY
Payer: COMMERCIAL

## 2024-01-19 DIAGNOSIS — O35.9XX0 SUSPECTED FETAL ANOMALY, ANTEPARTUM, SINGLE OR UNSPECIFIED FETUS: ICD-10-CM

## 2024-01-19 DIAGNOSIS — O35.9XX0 SUSPECTED FETAL ANOMALY, ANTEPARTUM, SINGLE OR UNSPECIFIED FETUS: Primary | ICD-10-CM

## 2024-01-19 PROCEDURE — 76816 OB US FOLLOW-UP PER FETUS: CPT

## 2024-01-19 PROCEDURE — 76816 OB US FOLLOW-UP PER FETUS: CPT | Mod: 26 | Performed by: OBSTETRICS & GYNECOLOGY

## 2024-01-19 NOTE — PROGRESS NOTES
The patient was seen for an ultrasound in the Maternal-Fetal Medicine Center at the [facility] today.  For a detailed report of the ultrasound examination, please see the ultrasound report which can be found under the imaging tab.    If you have questions regarding today's evaluation or if we can be of further service, please contact the Maternal-Fetal Medicine Center.    Nadia Montes MD  , OB/GYN  Maternal-Fetal Medicine  [phone] (Pager)

## 2024-02-07 ENCOUNTER — PRENATAL OFFICE VISIT (OUTPATIENT)
Dept: OBGYN | Facility: CLINIC | Age: 34
End: 2024-02-07
Attending: OBSTETRICS & GYNECOLOGY
Payer: COMMERCIAL

## 2024-02-07 VITALS
DIASTOLIC BLOOD PRESSURE: 85 MMHG | SYSTOLIC BLOOD PRESSURE: 132 MMHG | BODY MASS INDEX: 25.72 KG/M2 | HEIGHT: 67 IN | WEIGHT: 163.9 LBS

## 2024-02-07 DIAGNOSIS — Z34.80 PRENATAL CARE, SUBSEQUENT PREGNANCY, UNSPECIFIED TRIMESTER: ICD-10-CM

## 2024-02-07 DIAGNOSIS — O09.522 MULTIGRAVIDA OF ADVANCED MATERNAL AGE IN SECOND TRIMESTER: ICD-10-CM

## 2024-02-07 DIAGNOSIS — Z34.82 ENCOUNTER FOR SUPERVISION OF OTHER NORMAL PREGNANCY, SECOND TRIMESTER: Primary | ICD-10-CM

## 2024-02-07 PROBLEM — Z34.81 ENCOUNTER FOR SUPERVISION OF OTHER NORMAL PREGNANCY IN FIRST TRIMESTER: Status: ACTIVE | Noted: 2023-11-15

## 2024-02-07 PROCEDURE — 99213 OFFICE O/P EST LOW 20 MIN: CPT | Performed by: OBSTETRICS & GYNECOLOGY

## 2024-02-07 PROCEDURE — 99207 PR PRENATAL VISIT: CPT | Performed by: OBSTETRICS & GYNECOLOGY

## 2024-02-07 NOTE — PROGRESS NOTES
"SUBJECTIVE   Doing well, relieved after follow up mamie  Bought house, moving this weekend  Good FM  Denies ctx/LOF/cb/preE sx (including headache and swelling. Checking BP at home weekly, normal).     OBJECTIVE   /85   Ht 1.702 m (5' 7\")   Wt 74.3 kg (163 lb 14.4 oz)   LMP 2023   BMI 25.67 kg/m      Physical Exam  General: awake, alert, in no acute distress  Psych: mood appropriate   Abd: Gravid    ASSESSMENT & PLAN   Soraida Johnson is a 33 year old female , 23w6d by LMP with h/o gestational hypertension who presents for prenatal care. No concerns today.     Patient Active Problem List    Diagnosis Date Noted    Encounter for supervision of other normal pregnancy in second trimester 11/15/2023     Priority: Medium     [facility] MD pt - Celi    [x] Dating: c/b 6wk  [x] Rh: pos  [x] Rubella immune  [x] Hep B immune    [x] First tri screen: NT wnl  [x] Fetal anatomy US: small stomach on initial, normal on repeat  [x] Pap: PP    [x] Influenza vaccine  [x] COVID vaccine  _____________________________________  [ ] EOB folder  [ ] PP Contraception plan:  [ ] Labor plans:   [ ] Infant feeding plan:  [ ] TDAP vaccine  [ ] GCT:  ________________________________________  [x] RSV vaccine missed window  [ ] GBS:  [ ] OTC PP meds sent         Robin Barreto, MS3     I appreciate the note above by medical student, Robin Barreto.  I was present with the medical student who participated in the service and in the documentation of the note. I have verified the history and personally performed the physical exam and medical decision making. I agree with the assessment and plan of care as documented in the note.  Fay Alatorre MD MPH          "

## 2024-02-23 ENCOUNTER — LAB (OUTPATIENT)
Dept: LAB | Facility: CLINIC | Age: 34
End: 2024-02-23
Payer: COMMERCIAL

## 2024-02-23 DIAGNOSIS — Z34.82 ENCOUNTER FOR SUPERVISION OF OTHER NORMAL PREGNANCY, SECOND TRIMESTER: ICD-10-CM

## 2024-02-23 LAB — GLUCOSE 1H P 50 G GLC PO SERPL-MCNC: 99 MG/DL (ref 70–129)

## 2024-02-23 PROCEDURE — 82950 GLUCOSE TEST: CPT

## 2024-02-23 PROCEDURE — 36415 COLL VENOUS BLD VENIPUNCTURE: CPT

## 2024-03-25 ENCOUNTER — LAB (OUTPATIENT)
Dept: LAB | Facility: CLINIC | Age: 34
End: 2024-03-25
Payer: COMMERCIAL

## 2024-03-25 DIAGNOSIS — Z34.82 ENCOUNTER FOR SUPERVISION OF OTHER NORMAL PREGNANCY, SECOND TRIMESTER: ICD-10-CM

## 2024-03-25 LAB
BASOPHILS # BLD AUTO: 0 10E3/UL (ref 0–0.2)
BASOPHILS NFR BLD AUTO: 0 %
EOSINOPHIL # BLD AUTO: 0 10E3/UL (ref 0–0.7)
EOSINOPHIL NFR BLD AUTO: 0 %
ERYTHROCYTE [DISTWIDTH] IN BLOOD BY AUTOMATED COUNT: 13 % (ref 10–15)
HCT VFR BLD AUTO: 33.7 % (ref 35–47)
HGB BLD-MCNC: 11.7 G/DL (ref 11.7–15.7)
IMM GRANULOCYTES # BLD: 0.3 10E3/UL
IMM GRANULOCYTES NFR BLD: 2 %
LYMPHOCYTES # BLD AUTO: 1.9 10E3/UL (ref 0.8–5.3)
LYMPHOCYTES NFR BLD AUTO: 17 %
MCH RBC QN AUTO: 32.2 PG (ref 26.5–33)
MCHC RBC AUTO-ENTMCNC: 34.7 G/DL (ref 31.5–36.5)
MCV RBC AUTO: 93 FL (ref 78–100)
MONOCYTES # BLD AUTO: 0.7 10E3/UL (ref 0–1.3)
MONOCYTES NFR BLD AUTO: 6 %
NEUTROPHILS # BLD AUTO: 8.7 10E3/UL (ref 1.6–8.3)
NEUTROPHILS NFR BLD AUTO: 75 %
PLATELET # BLD AUTO: 191 10E3/UL (ref 150–450)
RBC # BLD AUTO: 3.63 10E6/UL (ref 3.8–5.2)
T PALLIDUM AB SER QL: NONREACTIVE
VIT D+METAB SERPL-MCNC: 30 NG/ML (ref 20–50)
WBC # BLD AUTO: 11.7 10E3/UL (ref 4–11)

## 2024-03-25 PROCEDURE — 36415 COLL VENOUS BLD VENIPUNCTURE: CPT

## 2024-03-25 PROCEDURE — 85025 COMPLETE CBC W/AUTO DIFF WBC: CPT

## 2024-03-25 PROCEDURE — 82306 VITAMIN D 25 HYDROXY: CPT

## 2024-03-25 PROCEDURE — 86780 TREPONEMA PALLIDUM: CPT

## 2024-04-08 DIAGNOSIS — O26.849 FETAL SIZE INCONSISTENT WITH DATES: Primary | ICD-10-CM

## 2024-04-19 ENCOUNTER — ANCILLARY PROCEDURE (OUTPATIENT)
Dept: ULTRASOUND IMAGING | Facility: CLINIC | Age: 34
End: 2024-04-19
Attending: OBSTETRICS & GYNECOLOGY
Payer: COMMERCIAL

## 2024-04-19 DIAGNOSIS — O26.849 FETAL SIZE INCONSISTENT WITH DATES: ICD-10-CM

## 2024-04-19 PROCEDURE — 76816 OB US FOLLOW-UP PER FETUS: CPT | Mod: 26 | Performed by: OBSTETRICS & GYNECOLOGY

## 2024-04-19 PROCEDURE — 76816 OB US FOLLOW-UP PER FETUS: CPT

## 2024-05-02 ENCOUNTER — TELEPHONE (OUTPATIENT)
Dept: OBGYN | Facility: CLINIC | Age: 34
End: 2024-05-02
Payer: COMMERCIAL

## 2024-05-05 ENCOUNTER — HEALTH MAINTENANCE LETTER (OUTPATIENT)
Age: 34
End: 2024-05-05

## 2024-05-07 NOTE — PATIENT INSTRUCTIONS
"Week 37 of Your Pregnancy: Care Instructions    Most babies are born between 37 and 40 weeks.    This is a good time to pack a bag to take with you to the birth. Then it will be ready to go when you are.    Learn about breastfeeding.  For example, find out about ways to hold your baby to make breastfeeding easier. And think about learning how to pump and store milk.     Know that crying is normal.  It's common for babies to cry 1 to 3 hours a day. Some cry more, and some cry less.     Learn why babies cry.  They may be hungry; have gas; need a diaper change; or feel cold, warm, tired, lonely, or tense. Sometimes they cry for unknown reasons.     Think about what will help you stay calm when your baby cries.  Taking slow, deep breaths can help. So can taking a break. It's okay to put your baby somewhere safe (like their crib) and walk away for a few minutes.     Learn about safe sleep for your baby.  Always put your baby to sleep on their back. Place them alone in a crib or bassinet with a firm, flat surface. Keep soft items like stuffed animals out of the crib.     Learn what to expect with  poop.  Your baby will have their own bowel patterns. Some babies have several bowel movements a day. Some have fewer.     Know that  babies will often have loose, yellow bowel movements.  Formula-fed babies have more formed stools. If your baby's poop looks like pellets, your baby is constipated.   Follow-up care is a key part of your treatment and safety. Be sure to make and go to all appointments, and call your doctor if you are having problems. It's also a good idea to know your test results and keep a list of the medicines you take.  Where can you learn more?  Go to https://www.Trusight.net/patiented  Enter N257 in the search box to learn more about \"Week 37 of Your Pregnancy: Care Instructions.\"  Current as of: July 10, 2023               Content Version: 14.0    2513-6280 [employer].   Care " instructions adapted under license by your healthcare professional. If you have questions about a medical condition or this instruction, always ask your healthcare professional. [employer] Inc[employer] disclaims any warranty or liability for your use of this information.    Thank you for trusting us with your care!     If you need to contact us for questions about:  Symptoms, Scheduling & Medical Questions; Non-urgent (2-3 day response) Grouply message, Urgent (needing response today) [phone] (if after 3:30pm next day response)   Prescriptions: Please call your Pharmacy   Billing: Bertha [phone] or [facility]:[phone]

## 2024-05-08 ENCOUNTER — ALLIED HEALTH/NURSE VISIT (OUTPATIENT)
Dept: OBGYN | Facility: CLINIC | Age: 34
End: 2024-05-08
Attending: OBSTETRICS & GYNECOLOGY
Payer: COMMERCIAL

## 2024-05-08 VITALS — BODY MASS INDEX: 28.13 KG/M2 | WEIGHT: 179.6 LBS

## 2024-05-08 DIAGNOSIS — Z34.83 ENCOUNTER FOR SUPERVISION OF OTHER NORMAL PREGNANCY IN THIRD TRIMESTER: ICD-10-CM

## 2024-05-08 DIAGNOSIS — Z34.81 ENCOUNTER FOR SUPERVISION OF OTHER NORMAL PREGNANCY IN FIRST TRIMESTER: Primary | ICD-10-CM

## 2024-05-08 PROCEDURE — 87653 STREP B DNA AMP PROBE: CPT | Performed by: OBSTETRICS & GYNECOLOGY

## 2024-05-08 PROCEDURE — 99213 OFFICE O/P EST LOW 20 MIN: CPT | Performed by: OBSTETRICS & GYNECOLOGY

## 2024-05-08 PROCEDURE — 99207 PR PRENATAL VISIT: CPT | Performed by: OBSTETRICS & GYNECOLOGY

## 2024-05-08 NOTE — PROGRESS NOTES
SUBJECTIVE   Doing well, no  major swelling  Taking BP at home and work, all normotensive  Occ B-H  Thinking IOL at 39w  Good FM  denies ctx/LOF/vb/preE sx    OBJECTIVE   Wt 81.5 kg (179 lb 9.6 oz)   LMP 2023   BMI 28.13 kg/m      See Ob Flowsheet    ASSESSMENT & PLAN   33 year old  at 36w6d, FIONA.    Patient Active Problem List    Diagnosis Date Noted    Encounter for supervision of other normal pregnancy in second trimester 11/15/2023     Priority: High     [facility] MD pt - Celi    [x] Dating: c/b 6wk  [x] Rh: pos  [x] Rubella immune  [x] Hep B immune    [x] First tri screen: NT wnl  [x] Fetal anatomy US: small stomach on initial, normal on repeat  [x] Pap: PP    [x] Influenza vaccine  [x] COVID vaccine  _____________________________________  [x] EOB folder  [X] Labor plans: likely epidural  [x] Infant feeding plan: BF (gave lactation number today given challenges with supply last pregnancy)  [x] TDAP vaccine at [facility] at 28w  [x] GCT: 99  ________________________________________  [x] RSV vaccine missed seasonal window  [ ] GBS today  [x] OTC PP meds sent  [x] support PP - mother will have Michael some, Soraida taking 13w and her partner gets 6 wks leave        Multigravida of advanced maternal age in second trimester 2024     Priority: Medium     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          RTC 1 week, IOL  8:30 AM    Fay Alatorre MD MPH

## 2024-05-09 LAB — GP B STREP DNA SPEC QL NAA+PROBE: NEGATIVE

## 2024-05-16 ENCOUNTER — MYC MEDICAL ADVICE (OUTPATIENT)
Dept: OBGYN | Facility: CLINIC | Age: 34
End: 2024-05-16
Payer: COMMERCIAL

## 2024-05-17 ENCOUNTER — TELEPHONE (OUTPATIENT)
Dept: OBGYN | Facility: CLINIC | Age: 34
End: 2024-05-17
Payer: COMMERCIAL

## 2024-05-17 NOTE — TELEPHONE ENCOUNTER
Breast pump Rx form received via [employer]. Printed, labeled and placed in MD's bin for completion.

## 2024-05-17 NOTE — TELEPHONE ENCOUNTER
Received breast pump order.    Unsure if this is prescribed by us or by [facility] as form does not specify a prescriber.    Placed in Dr. Alatorre's mailbox

## 2024-05-23 ENCOUNTER — HOSPITAL ENCOUNTER (INPATIENT)
Facility: CLINIC | Age: 34
LOS: 3 days | Discharge: HOME | End: 2024-05-26
Attending: OBSTETRICS & GYNECOLOGY | Admitting: OBSTETRICS & GYNECOLOGY
Payer: COMMERCIAL

## 2024-05-23 ENCOUNTER — ANESTHESIA (OUTPATIENT)
Dept: OBGYN | Facility: CLINIC | Age: 34
End: 2024-05-23
Payer: COMMERCIAL

## 2024-05-23 ENCOUNTER — ANESTHESIA EVENT (OUTPATIENT)
Dept: OBGYN | Facility: CLINIC | Age: 34
End: 2024-05-23
Payer: COMMERCIAL

## 2024-05-23 DIAGNOSIS — Z3A.39 39 WEEKS GESTATION OF PREGNANCY: ICD-10-CM

## 2024-05-23 DIAGNOSIS — F32.A MILD DEPRESSION: ICD-10-CM

## 2024-05-23 DIAGNOSIS — O13.9 GESTATIONAL HYPERTENSION, ANTEPARTUM: ICD-10-CM

## 2024-05-23 PROBLEM — Z34.90 PREGNANCY: Status: ACTIVE | Noted: 2024-05-23

## 2024-05-23 LAB
ABO/RH(D): NORMAL
ALBUMIN MFR UR ELPH: 8 MG/DL
ALBUMIN SERPL BCG-MCNC: 3.5 G/DL (ref 3.5–5.2)
ALP SERPL-CCNC: 184 U/L (ref 40–150)
ALT SERPL W P-5'-P-CCNC: 11 U/L (ref 0–50)
ANION GAP SERPL CALCULATED.3IONS-SCNC: 14 MMOL/L (ref 7–15)
ANTIBODY SCREEN: NEGATIVE
AST SERPL W P-5'-P-CCNC: 18 U/L (ref 0–45)
BILIRUB SERPL-MCNC: 0.2 MG/DL
BUN SERPL-MCNC: 9.7 MG/DL (ref 6–20)
CALCIUM SERPL-MCNC: 8.7 MG/DL (ref 8.6–10)
CHLORIDE SERPL-SCNC: 104 MMOL/L (ref 98–107)
CREAT SERPL-MCNC: 0.57 MG/DL (ref 0.51–0.95)
CREAT UR-MCNC: 51 MG/DL
DEPRECATED HCO3 PLAS-SCNC: 19 MMOL/L (ref 22–29)
EGFRCR SERPLBLD CKD-EPI 2021: >90 ML/MIN/1.73M2
ERYTHROCYTE [DISTWIDTH] IN BLOOD BY AUTOMATED COUNT: 12.9 % (ref 10–15)
GLUCOSE SERPL-MCNC: 114 MG/DL (ref 70–99)
HCT VFR BLD AUTO: 31 % (ref 35–47)
HGB BLD-MCNC: 10.7 G/DL (ref 11.7–15.7)
MCH RBC QN AUTO: 30.2 PG (ref 26.5–33)
MCHC RBC AUTO-ENTMCNC: 34.5 G/DL (ref 31.5–36.5)
MCV RBC AUTO: 88 FL (ref 78–100)
PLATELET # BLD AUTO: 184 10E3/UL (ref 150–450)
POTASSIUM SERPL-SCNC: 3.8 MMOL/L (ref 3.4–5.3)
PROT SERPL-MCNC: 6.2 G/DL (ref 6.4–8.3)
PROT/CREAT 24H UR: 0.16 MG/MG CR (ref 0–0.2)
RBC # BLD AUTO: 3.54 10E6/UL (ref 3.8–5.2)
SODIUM SERPL-SCNC: 137 MMOL/L (ref 135–145)
SPECIMEN EXPIRATION DATE: NORMAL
WBC # BLD AUTO: 11.6 10E3/UL (ref 4–11)

## 2024-05-23 PROCEDURE — 86780 TREPONEMA PALLIDUM: CPT

## 2024-05-23 PROCEDURE — 370N000003 HC ANESTHESIA WARD SERVICE: Performed by: STUDENT IN AN ORGANIZED HEALTH CARE EDUCATION/TRAINING PROGRAM

## 2024-05-23 PROCEDURE — 258N000003 HC RX IP 258 OP 636

## 2024-05-23 PROCEDURE — 250N000009 HC RX 250

## 2024-05-23 PROCEDURE — 84156 ASSAY OF PROTEIN URINE: CPT

## 2024-05-23 PROCEDURE — 80053 COMPREHEN METABOLIC PANEL: CPT

## 2024-05-23 PROCEDURE — 85014 HEMATOCRIT: CPT

## 2024-05-23 PROCEDURE — 120N000002 HC R&B MED SURG/OB UMMC

## 2024-05-23 PROCEDURE — 3E033VJ INTRODUCTION OF OTHER HORMONE INTO PERIPHERAL VEIN, PERCUTANEOUS APPROACH: ICD-10-PCS | Performed by: OBSTETRICS & GYNECOLOGY

## 2024-05-23 PROCEDURE — 86900 BLOOD TYPING SEROLOGIC ABO: CPT

## 2024-05-23 RX ORDER — NALOXONE HYDROCHLORIDE 0.4 MG/ML
0.4 INJECTION, SOLUTION INTRAMUSCULAR; INTRAVENOUS; SUBCUTANEOUS
Status: DISCONTINUED | OUTPATIENT
Start: 2024-05-23 | End: 2024-05-24 | Stop reason: HOSPADM

## 2024-05-23 RX ORDER — FENTANYL CITRATE 50 UG/ML
100 INJECTION, SOLUTION INTRAMUSCULAR; INTRAVENOUS
Status: DISCONTINUED | OUTPATIENT
Start: 2024-05-23 | End: 2024-05-24 | Stop reason: HOSPADM

## 2024-05-23 RX ORDER — LIDOCAINE HYDROCHLORIDE 10 MG/ML
INJECTION, SOLUTION EPIDURAL; INFILTRATION; INTRACAUDAL; PERINEURAL
Status: DISCONTINUED
Start: 2024-05-23 | End: 2024-05-24 | Stop reason: HOSPADM

## 2024-05-23 RX ORDER — PROCHLORPERAZINE 25 MG
25 SUPPOSITORY, RECTAL RECTAL EVERY 12 HOURS PRN
Status: DISCONTINUED | OUTPATIENT
Start: 2024-05-23 | End: 2024-05-24 | Stop reason: HOSPADM

## 2024-05-23 RX ORDER — LOPERAMIDE HCL 2 MG
4 CAPSULE ORAL
Status: DISCONTINUED | OUTPATIENT
Start: 2024-05-23 | End: 2024-05-24 | Stop reason: HOSPADM

## 2024-05-23 RX ORDER — CARBOPROST TROMETHAMINE 250 UG/ML
250 INJECTION, SOLUTION INTRAMUSCULAR
Status: DISCONTINUED | OUTPATIENT
Start: 2024-05-23 | End: 2024-05-24 | Stop reason: HOSPADM

## 2024-05-23 RX ORDER — ONDANSETRON 4 MG/1
4 TABLET, ORALLY DISINTEGRATING ORAL EVERY 6 HOURS PRN
Status: DISCONTINUED | OUTPATIENT
Start: 2024-05-23 | End: 2024-05-24 | Stop reason: HOSPADM

## 2024-05-23 RX ORDER — PROCHLORPERAZINE MALEATE 10 MG
10 TABLET ORAL EVERY 6 HOURS PRN
Status: DISCONTINUED | OUTPATIENT
Start: 2024-05-23 | End: 2024-05-24 | Stop reason: HOSPADM

## 2024-05-23 RX ORDER — OXYTOCIN 10 [USP'U]/ML
10 INJECTION, SOLUTION INTRAMUSCULAR; INTRAVENOUS
Status: DISCONTINUED | OUTPATIENT
Start: 2024-05-23 | End: 2024-05-24

## 2024-05-23 RX ORDER — KETOROLAC TROMETHAMINE 30 MG/ML
30 INJECTION, SOLUTION INTRAMUSCULAR; INTRAVENOUS
Status: DISCONTINUED | OUTPATIENT
Start: 2024-05-23 | End: 2024-05-24

## 2024-05-23 RX ORDER — METOCLOPRAMIDE HYDROCHLORIDE 5 MG/ML
10 INJECTION INTRAMUSCULAR; INTRAVENOUS EVERY 6 HOURS PRN
Status: DISCONTINUED | OUTPATIENT
Start: 2024-05-23 | End: 2024-05-24 | Stop reason: HOSPADM

## 2024-05-23 RX ORDER — OXYTOCIN/0.9 % SODIUM CHLORIDE 30/500 ML
340 PLASTIC BAG, INJECTION (ML) INTRAVENOUS CONTINUOUS PRN
Status: DISCONTINUED | OUTPATIENT
Start: 2024-05-23 | End: 2024-05-24 | Stop reason: HOSPADM

## 2024-05-23 RX ORDER — ACETAMINOPHEN 325 MG/1
650 TABLET ORAL EVERY 4 HOURS PRN
Status: DISCONTINUED | OUTPATIENT
Start: 2024-05-23 | End: 2024-05-24 | Stop reason: HOSPADM

## 2024-05-23 RX ORDER — LIDOCAINE 40 MG/G
CREAM TOPICAL
Status: DISCONTINUED | OUTPATIENT
Start: 2024-05-23 | End: 2024-05-24 | Stop reason: HOSPADM

## 2024-05-23 RX ORDER — METOCLOPRAMIDE 10 MG/1
10 TABLET ORAL EVERY 6 HOURS PRN
Status: DISCONTINUED | OUTPATIENT
Start: 2024-05-23 | End: 2024-05-24 | Stop reason: HOSPADM

## 2024-05-23 RX ORDER — CITRIC ACID/SODIUM CITRATE 334-500MG
30 SOLUTION, ORAL ORAL
Status: DISCONTINUED | OUTPATIENT
Start: 2024-05-23 | End: 2024-05-24 | Stop reason: HOSPADM

## 2024-05-23 RX ORDER — MISOPROSTOL 200 UG/1
400 TABLET ORAL
Status: DISCONTINUED | OUTPATIENT
Start: 2024-05-23 | End: 2024-05-24 | Stop reason: HOSPADM

## 2024-05-23 RX ORDER — METHYLERGONOVINE MALEATE 0.2 MG/ML
200 INJECTION INTRAVENOUS
Status: DISCONTINUED | OUTPATIENT
Start: 2024-05-23 | End: 2024-05-24 | Stop reason: HOSPADM

## 2024-05-23 RX ORDER — OXYTOCIN 10 [USP'U]/ML
10 INJECTION, SOLUTION INTRAMUSCULAR; INTRAVENOUS
Status: DISCONTINUED | OUTPATIENT
Start: 2024-05-23 | End: 2024-05-24 | Stop reason: HOSPADM

## 2024-05-23 RX ORDER — OXYTOCIN/0.9 % SODIUM CHLORIDE 30/500 ML
PLASTIC BAG, INJECTION (ML) INTRAVENOUS
Status: DISCONTINUED
Start: 2024-05-23 | End: 2024-05-24 | Stop reason: HOSPADM

## 2024-05-23 RX ORDER — NALOXONE HYDROCHLORIDE 0.4 MG/ML
0.2 INJECTION, SOLUTION INTRAMUSCULAR; INTRAVENOUS; SUBCUTANEOUS
Status: DISCONTINUED | OUTPATIENT
Start: 2024-05-23 | End: 2024-05-24 | Stop reason: HOSPADM

## 2024-05-23 RX ORDER — ONDANSETRON 2 MG/ML
4 INJECTION INTRAMUSCULAR; INTRAVENOUS EVERY 6 HOURS PRN
Status: DISCONTINUED | OUTPATIENT
Start: 2024-05-23 | End: 2024-05-24 | Stop reason: HOSPADM

## 2024-05-23 RX ORDER — MISOPROSTOL 200 UG/1
800 TABLET ORAL
Status: DISCONTINUED | OUTPATIENT
Start: 2024-05-23 | End: 2024-05-24 | Stop reason: HOSPADM

## 2024-05-23 RX ORDER — OXYTOCIN/0.9 % SODIUM CHLORIDE 30/500 ML
100-340 PLASTIC BAG, INJECTION (ML) INTRAVENOUS CONTINUOUS PRN
Status: DISCONTINUED | OUTPATIENT
Start: 2024-05-23 | End: 2024-05-24

## 2024-05-23 RX ORDER — LOPERAMIDE HCL 2 MG
2 CAPSULE ORAL
Status: DISCONTINUED | OUTPATIENT
Start: 2024-05-23 | End: 2024-05-24 | Stop reason: HOSPADM

## 2024-05-23 RX ORDER — SODIUM CHLORIDE, SODIUM LACTATE, POTASSIUM CHLORIDE, CALCIUM CHLORIDE 600; 310; 30; 20 MG/100ML; MG/100ML; MG/100ML; MG/100ML
INJECTION, SOLUTION INTRAVENOUS CONTINUOUS
Status: DISCONTINUED | OUTPATIENT
Start: 2024-05-23 | End: 2024-05-24 | Stop reason: HOSPADM

## 2024-05-23 RX ORDER — CITRIC ACID/SODIUM CITRATE 334-500MG
30 SOLUTION, ORAL ORAL ONCE
Status: DISCONTINUED | OUTPATIENT
Start: 2024-05-23 | End: 2024-05-24 | Stop reason: HOSPADM

## 2024-05-23 RX ORDER — ESCITALOPRAM OXALATE 10 MG/1
10 TABLET ORAL DAILY
Status: DISCONTINUED | OUTPATIENT
Start: 2024-05-23 | End: 2024-05-26 | Stop reason: HOSPADM

## 2024-05-23 RX ORDER — SODIUM CHLORIDE, SODIUM LACTATE, POTASSIUM CHLORIDE, CALCIUM CHLORIDE 600; 310; 30; 20 MG/100ML; MG/100ML; MG/100ML; MG/100ML
INJECTION, SOLUTION INTRAVENOUS CONTINUOUS PRN
Status: DISCONTINUED | OUTPATIENT
Start: 2024-05-23 | End: 2024-05-24 | Stop reason: HOSPADM

## 2024-05-23 RX ORDER — MISOPROSTOL 200 UG/1
TABLET ORAL
Status: DISCONTINUED
Start: 2024-05-23 | End: 2024-05-24 | Stop reason: HOSPADM

## 2024-05-23 RX ORDER — OXYTOCIN 10 [USP'U]/ML
INJECTION, SOLUTION INTRAMUSCULAR; INTRAVENOUS
Status: DISCONTINUED
Start: 2024-05-23 | End: 2024-05-24 | Stop reason: HOSPADM

## 2024-05-23 RX ORDER — OXYTOCIN/0.9 % SODIUM CHLORIDE 30/500 ML
1-24 PLASTIC BAG, INJECTION (ML) INTRAVENOUS CONTINUOUS
Status: DISCONTINUED | OUTPATIENT
Start: 2024-05-23 | End: 2024-05-24 | Stop reason: HOSPADM

## 2024-05-23 RX ORDER — TRANEXAMIC ACID 10 MG/ML
1 INJECTION, SOLUTION INTRAVENOUS EVERY 30 MIN PRN
Status: DISCONTINUED | OUTPATIENT
Start: 2024-05-23 | End: 2024-05-24 | Stop reason: HOSPADM

## 2024-05-23 RX ORDER — IBUPROFEN 800 MG/1
800 TABLET, FILM COATED ORAL
Status: DISCONTINUED | OUTPATIENT
Start: 2024-05-23 | End: 2024-05-24

## 2024-05-23 RX ADMIN — Medication 2 MILLI-UNITS/MIN: at 20:55

## 2024-05-23 RX ADMIN — SODIUM CHLORIDE, POTASSIUM CHLORIDE, SODIUM LACTATE AND CALCIUM CHLORIDE: 600; 310; 30; 20 INJECTION, SOLUTION INTRAVENOUS at 20:47

## 2024-05-23 ASSESSMENT — ACTIVITIES OF DAILY LIVING (ADL)
ADLS_ACUITY_SCORE: 18
ADLS_ACUITY_SCORE: 18
ADLS_ACUITY_SCORE: 35
ADLS_ACUITY_SCORE: 18

## 2024-05-23 NOTE — PROVIDER NOTIFICATION
24 1815   Provider Notification   Provider Name/Title Dr Jimenez   Method of Notification Electronic Page   Request Evaluate in Person   Notification Reason Patient Arrived     Provider notified of patient arrival for elective IOL. VSS ex BP elevated, will get serial BPs. IV placed and labs sent. FHR and toco applied. FHR reactive. Pt denies contractions, leaking, or bleeding. Spouse at bedside for support. Pt planning epidural for pain when labor progresses. Anticipate .

## 2024-05-23 NOTE — ANESTHESIA PREPROCEDURE EVALUATION
Anesthesia Pre-Procedure Evaluation    Patient: Soraida Johnson   MRN: [id number] : 1990        Procedure :           Past Medical History:   Diagnosis Date    Depression     escitalopram 10 mg daily    Gestational hypertension 2022      Past Surgical History:   Procedure Laterality Date    NO HISTORY OF SURGERY        No Known Allergies   Social History     Tobacco Use    Smoking status: Never    Smokeless tobacco: Never   Substance Use Topics    Alcohol use: Yes     Comment: a couple per week      Wt Readings from Last 1 Encounters:   24 81.5 kg (179 lb 9.6 oz)        Anesthesia Evaluation   Pt has had prior anesthetic. Type: OB Labor Epidural.        ROS/MED HX  ENT/Pulmonary:  - neg pulmonary ROS     Neurologic:  - neg neurologic ROS     Cardiovascular: Comment: gHTN      METS/Exercise Tolerance:     Hematologic: Comments: Plt 191  Hgb 11.7 - neg hematologic  ROS     Musculoskeletal:       GI/Hepatic:  - neg GI/hepatic ROS     Renal/Genitourinary:       Endo:  - neg endo ROS     Psychiatric/Substance Use:     (+) psychiatric history depression       Infectious Disease:       Malignancy:       Other:   34yo  at 39w0d    (-) previous        Physical Exam    Airway        Mallampati: II   TM distance: > 3 FB   Neck ROM: full   Mouth opening: > 3 cm    Respiratory Devices and Support         Dental  no notable dental history         Cardiovascular   cardiovascular exam normal          Pulmonary   pulmonary exam normal                OUTSIDE LABS:  CBC:   Lab Results   Component Value Date    WBC 11.7 (H) 2024    WBC 9.5 2023    HGB 11.7 2024    HGB 13.5 2023    HCT 33.7 (L) 2024    HCT 37.2 2023     2024     2023     BMP:   Lab Results   Component Value Date     2022     10/18/2021    POTASSIUM 3.7 2022    POTASSIUM 4.2 10/18/2021    CHLORIDE 104 2022    CHLORIDE 107 10/18/2021     "CO2 21 04/27/2022    CO2 26 10/18/2021    BUN 11 04/27/2022    BUN 10 10/18/2021    CR 0.63 04/27/2022    CR 0.64 10/18/2021    GLC 83 04/27/2022    GLC 75 10/18/2021     COAGS: No results found for: \"PTT\", \"INR\", \"FIBR\"  POC: No results found for: \"BGM\", \"HCG\", \"HCGS\"  HEPATIC:   Lab Results   Component Value Date    ALT 29 04/27/2022    AST 21 04/27/2022     OTHER:   Lab Results   Component Value Date    A1C 5.2 11/13/2023    CHANDU 8.7 04/27/2022       Anesthesia Plan    ASA Status:  2       Anesthesia Type: Epidural.              Consents    Anesthesia Plan(s) and associated risks, benefits, and realistic alternatives discussed. Questions answered and patient/representative(s) expressed understanding.     - Discussed:     - Discussed with:  Patient            Postoperative Care            Comments:               Adarsh Albright MD    I have reviewed the pertinent notes and labs in the chart from the past 30 days and (re)examined the patient.  Any updates or changes from those notes are reflected in this note.                  "

## 2024-05-24 LAB — T PALLIDUM AB SER QL: NONREACTIVE

## 2024-05-24 PROCEDURE — 59409 OBSTETRICAL CARE: CPT | Mod: GC | Performed by: OBSTETRICS & GYNECOLOGY

## 2024-05-24 PROCEDURE — 250N000011 HC RX IP 250 OP 636: Performed by: STUDENT IN AN ORGANIZED HEALTH CARE EDUCATION/TRAINING PROGRAM

## 2024-05-24 PROCEDURE — 722N000001 HC LABOR CARE VAGINAL DELIVERY SINGLE

## 2024-05-24 PROCEDURE — 10907ZC DRAINAGE OF AMNIOTIC FLUID, THERAPEUTIC FROM PRODUCTS OF CONCEPTION, VIA NATURAL OR ARTIFICIAL OPENING: ICD-10-PCS | Performed by: OBSTETRICS & GYNECOLOGY

## 2024-05-24 PROCEDURE — 59409 OBSTETRICAL CARE: CPT | Performed by: STUDENT IN AN ORGANIZED HEALTH CARE EDUCATION/TRAINING PROGRAM

## 2024-05-24 PROCEDURE — 250N000013 HC RX MED GY IP 250 OP 250 PS 637

## 2024-05-24 PROCEDURE — 258N000003 HC RX IP 258 OP 636

## 2024-05-24 PROCEDURE — 250N000011 HC RX IP 250 OP 636

## 2024-05-24 PROCEDURE — 250N000009 HC RX 250

## 2024-05-24 PROCEDURE — 120N000002 HC R&B MED SURG/OB UMMC

## 2024-05-24 RX ORDER — CARBOPROST TROMETHAMINE 250 UG/ML
250 INJECTION, SOLUTION INTRAMUSCULAR
Status: DISCONTINUED | OUTPATIENT
Start: 2024-05-24 | End: 2024-05-26 | Stop reason: HOSPADM

## 2024-05-24 RX ORDER — FENTANYL/ROPIVACAINE/NS/PF 2MCG/ML-.1
PLASTIC BAG, INJECTION (ML) EPIDURAL
Status: DISCONTINUED | OUTPATIENT
Start: 2024-05-24 | End: 2024-05-26 | Stop reason: HOSPADM

## 2024-05-24 RX ORDER — OXYTOCIN/0.9 % SODIUM CHLORIDE 30/500 ML
340 PLASTIC BAG, INJECTION (ML) INTRAVENOUS CONTINUOUS PRN
Status: DISCONTINUED | OUTPATIENT
Start: 2024-05-24 | End: 2024-05-26 | Stop reason: HOSPADM

## 2024-05-24 RX ORDER — METHYLERGONOVINE MALEATE 0.2 MG/ML
200 INJECTION INTRAVENOUS
Status: DISCONTINUED | OUTPATIENT
Start: 2024-05-24 | End: 2024-05-26 | Stop reason: HOSPADM

## 2024-05-24 RX ORDER — LOPERAMIDE HCL 2 MG
4 CAPSULE ORAL
Status: DISCONTINUED | OUTPATIENT
Start: 2024-05-24 | End: 2024-05-26 | Stop reason: HOSPADM

## 2024-05-24 RX ORDER — OXYTOCIN 10 [USP'U]/ML
10 INJECTION, SOLUTION INTRAMUSCULAR; INTRAVENOUS
Status: DISCONTINUED | OUTPATIENT
Start: 2024-05-24 | End: 2024-05-26 | Stop reason: HOSPADM

## 2024-05-24 RX ORDER — MODIFIED LANOLIN
OINTMENT (GRAM) TOPICAL
Status: DISCONTINUED | OUTPATIENT
Start: 2024-05-24 | End: 2024-05-26 | Stop reason: HOSPADM

## 2024-05-24 RX ORDER — DOCUSATE SODIUM 100 MG/1
100 CAPSULE, LIQUID FILLED ORAL DAILY
Status: DISCONTINUED | OUTPATIENT
Start: 2024-05-24 | End: 2024-05-26 | Stop reason: HOSPADM

## 2024-05-24 RX ORDER — IBUPROFEN 800 MG/1
800 TABLET, FILM COATED ORAL EVERY 6 HOURS PRN
Status: DISCONTINUED | OUTPATIENT
Start: 2024-05-24 | End: 2024-05-26 | Stop reason: HOSPADM

## 2024-05-24 RX ORDER — BISACODYL 10 MG
10 SUPPOSITORY, RECTAL RECTAL DAILY PRN
Status: DISCONTINUED | OUTPATIENT
Start: 2024-05-24 | End: 2024-05-26 | Stop reason: HOSPADM

## 2024-05-24 RX ORDER — MISOPROSTOL 200 UG/1
800 TABLET ORAL
Status: DISCONTINUED | OUTPATIENT
Start: 2024-05-24 | End: 2024-05-26 | Stop reason: HOSPADM

## 2024-05-24 RX ORDER — FENTANYL CITRATE-0.9 % NACL/PF 10 MCG/ML
100 PLASTIC BAG, INJECTION (ML) INTRAVENOUS EVERY 5 MIN PRN
Status: DISCONTINUED | OUTPATIENT
Start: 2024-05-24 | End: 2024-05-26 | Stop reason: HOSPADM

## 2024-05-24 RX ORDER — MISOPROSTOL 200 UG/1
400 TABLET ORAL
Status: DISCONTINUED | OUTPATIENT
Start: 2024-05-24 | End: 2024-05-26 | Stop reason: HOSPADM

## 2024-05-24 RX ORDER — NALBUPHINE HYDROCHLORIDE 20 MG/ML
2.5-5 INJECTION, SOLUTION INTRAMUSCULAR; INTRAVENOUS; SUBCUTANEOUS EVERY 6 HOURS PRN
Status: DISCONTINUED | OUTPATIENT
Start: 2024-05-24 | End: 2024-05-26 | Stop reason: HOSPADM

## 2024-05-24 RX ORDER — ACETAMINOPHEN 325 MG/1
650 TABLET ORAL EVERY 4 HOURS PRN
Status: DISCONTINUED | OUTPATIENT
Start: 2024-05-24 | End: 2024-05-26 | Stop reason: HOSPADM

## 2024-05-24 RX ORDER — TRANEXAMIC ACID 10 MG/ML
1 INJECTION, SOLUTION INTRAVENOUS EVERY 30 MIN PRN
Status: DISCONTINUED | OUTPATIENT
Start: 2024-05-24 | End: 2024-05-26 | Stop reason: HOSPADM

## 2024-05-24 RX ORDER — HYDROCORTISONE 25 MG/G
CREAM TOPICAL 3 TIMES DAILY PRN
Status: DISCONTINUED | OUTPATIENT
Start: 2024-05-24 | End: 2024-05-26 | Stop reason: HOSPADM

## 2024-05-24 RX ORDER — LOPERAMIDE HCL 2 MG
2 CAPSULE ORAL
Status: DISCONTINUED | OUTPATIENT
Start: 2024-05-24 | End: 2024-05-26 | Stop reason: HOSPADM

## 2024-05-24 RX ADMIN — ESCITALOPRAM OXALATE 10 MG: 5 TABLET, FILM COATED ORAL at 09:03

## 2024-05-24 RX ADMIN — Medication 340 ML/HR: at 17:06

## 2024-05-24 RX ADMIN — Medication: at 02:27

## 2024-05-24 RX ADMIN — ACETAMINOPHEN 650 MG: 325 TABLET, FILM COATED ORAL at 15:42

## 2024-05-24 RX ADMIN — DOCUSATE SODIUM 100 MG: 100 CAPSULE, LIQUID FILLED ORAL at 18:51

## 2024-05-24 RX ADMIN — ACETAMINOPHEN 650 MG: 325 TABLET, FILM COATED ORAL at 20:14

## 2024-05-24 RX ADMIN — MISOPROSTOL 800 MCG: 200 TABLET ORAL at 17:27

## 2024-05-24 RX ADMIN — LIDOCAINE HYDROCHLORIDE 20 ML: 10 INJECTION, SOLUTION EPIDURAL; INFILTRATION; INTRACAUDAL; PERINEURAL at 17:17

## 2024-05-24 RX ADMIN — Medication 10 ML: at 15:47

## 2024-05-24 RX ADMIN — KETOROLAC TROMETHAMINE 30 MG: 30 INJECTION, SOLUTION INTRAMUSCULAR at 17:35

## 2024-05-24 RX ADMIN — SODIUM CHLORIDE, POTASSIUM CHLORIDE, SODIUM LACTATE AND CALCIUM CHLORIDE: 600; 310; 30; 20 INJECTION, SOLUTION INTRAVENOUS at 08:39

## 2024-05-24 ASSESSMENT — ACTIVITIES OF DAILY LIVING (ADL)
ADLS_ACUITY_SCORE: 22
ADLS_ACUITY_SCORE: 22
ADLS_ACUITY_SCORE: 21
ADLS_ACUITY_SCORE: 18
ADLS_ACUITY_SCORE: 18
ADLS_ACUITY_SCORE: 22
ADLS_ACUITY_SCORE: 22
ADLS_ACUITY_SCORE: 18
ADLS_ACUITY_SCORE: 22
ADLS_ACUITY_SCORE: 18

## 2024-05-24 NOTE — PROGRESS NOTES
[facility]  OB Labor Progress Note    S: Patient is feeling comfortable with epidural, feeling more pressure between contractions.    O:   /79   Temp 97.6  F (36.4  C) (Oral)   Resp 18   LMP 2023   SpO2 99%   Gen: alert, no acute distress    Cervix: 9.5 / 100% / 0  Membranes: ruptured, clear fluid    Straight cath performed for 400cc    FHT: Baseline 130 bpm, moderate variability, accelerations present, early and variable decelerations present  [place]: 3-4 contractions in 10 minutes    A/P: 33 year old  at 39w1d admitted for eIOL.    # Labor  Labor course: 3/50/-1 ()> pit ()> 5/50/-2, ballotable (0245, 0500)> 6/50/-2, AROM (0900)> 6/100/0 (1045)> 8/100/0 (1225)> 9/100/0 (~1345)> 9.5/100/0 (1430)  Fetal heart tracing: category 2 due to variables and early decelerations, however moderate variability and overall reassuring  - Management: continue present management with IV Pitocin  - GBS negative: no antibiotics indicated  - Continuous fetal monitoring, intrauterine resuscitation PRN  - Pain: per patient preference, epidural in place  - PPH ppx: avoid methergine    Maria Victoria Jimenez MD  Obstetrics & Gynecology, PGY-2  2024 2:37 PM

## 2024-05-24 NOTE — ANESTHESIA PROCEDURE NOTES
"Epidural catheter Procedure Note    Pre-Procedure   Staff -        Anesthesiologist:  Adilson Gomez DO       Resident/Fellow: Adarsh Albright MD       Performed By: resident and anesthesiologist       Location: OB       Pre-Anesthestic Checklist: patient identified, IV checked, risks and benefits discussed, informed consent, monitors and equipment checked, pre-op evaluation, at physician/surgeon's request and post-op pain management  Timeout:       Correct Patient: Yes        Correct Procedure: Yes        Correct Site: Yes        Correct Position: Yes   Procedure Documentation  Procedure: epidural catheter       Diagnosis: Labor analgesia       Patient Position: sitting       Patient Prep/Sterile Barriers: sterile gloves, mask, patient draped       Skin prep: Chloraprep       Local skin infiltrated with mL of 2% lidocaine.        Insertion Site: L4-5. (midline approach).       Technique: LORT saline        SANTY at 5 cm.       Needle Type: [place] needle       Needle Gauge: 17.        Needle Length (Inches): 3.5        Catheter: 19 G.          Catheter threaded easily.         5 cm epidural space.         Threaded 10 cm at skin.         # of attempts: 1 and  # of redirects:  0    Assessment/Narrative         Paresthesias: No.       Test dose of 3 mL lidocaine 1.5% w/ 1:200,000 epinephrine at 02:01 CDT.         Test dose negative, 3 minutes after injection, for signs of intravascular, subdural, or intrathecal injection.       Insertion/Infusion Method: LORT saline       Aspiration negative for Heme or CSF via Epidural Catheter.    Medication(s) Administered   0.125% Bupivacaine + 2 mcg/mL Fentanyl via CADD (Epidural) - EPIDURAL   8 mL - 5/24/2024 2:05:00 AM    FOR [facility] ONLY:   Pain Team Contact information: please page the Pain Team Via [employer]MT[employer]. Search \"Pain\". During daytime hours, please page the attending first. At night please page the resident first.      "

## 2024-05-24 NOTE — PROGRESS NOTES
OBGYN Attending Labor Progress Note    S: Contractions uncomfortable, pushing bolus but not getting a lot of relief.     O:  /79   Temp 97.6  F (36.4  C) (Oral)   Resp 18   LMP 2023   SpO2 99%   Gen: NAD  SVE: anterior lip/0 station  FHT: 130/moderate jannette/+accels/+occasional jannette decels w ctx  [place]: q3m    A/P:   Soraida Johnson is a 33 year old  at 39w1d admitted for IOL.    -FHT cat 2, overall reassuring with good variability, no concern for fetal acidemia  -trial of pushing past cervix unsuccessful. Will ask anesthesia to consider epidural bolus, recheck in 1 hour.  -anticipate     Chayito Loo MD, MSCI    [facility]/OBGYN  24 3:43 PM

## 2024-05-24 NOTE — PROGRESS NOTES
Provider notification:   Provider:  Eze DEL TORO at bedside during initiation of second stage of labor.     Second Stage of Labor Algorithm    Passenger:  See flowsheets for fetal heart rate tracing data.   EFM interpretation suggests: absence of concern for metabolic acidemia due to: presence of accelerations. EFM suggests no concern for interruption of the oxygen pathway..    Position  Fetal position:occiput anterior  Fetal station: 0    Power  See flowsheets for uterine activity data.  Contraction frequency: every 3 minutes.   Contraction strength: strong by palpation.  Maternal current position change frequency:every 30 minutes    Psyche  Epidural / ITN: Yes  Maternal pain management: comfortable  Urge to push: present    Plan  After discussion with provider:  Interventions to optimize second stage:active pushing        ..

## 2024-05-24 NOTE — PROGRESS NOTES
[facility]  OB Labor Progress Note    S: Patient is feeling comfortable with epidural. Would like SVE and AROM.    O:   /59 (Cuff Size: Adult Regular)   Temp 98.6  F (37  C) (Oral)   Resp 18   LMP 2023   SpO2 98%   Gen: alert, no acute distress    Cervix: 6 / 50% / -2  Membranes: AROM w/clear fluid, bloody show    FHT: Baseline 145 bpm, moderate variability, accelerations present, decelerations absent  [place]: 3 contractions in 10 minutes    A/P: 33 year old  at 39w1d admitted for eIOL.    # Labor  Labor course: 3/50/-1 ()> pit ()> 5/50/-2, ballotable (0245, 0500)> /50/-2, AROM (0900)  Fetal heart tracing: category 1  - Management: continue present management with IV Pitocin  - GBS negative: no antibiotics indicated  - Continuous fetal monitoring, intrauterine resuscitation PRN  - Pain: per patient preference, epidural in place  - PPH ppx: avoid methergine    Maria Victoria Jimenez MD  Obstetrics & Gynecology, PGY-2  2024 11:15 AM

## 2024-05-24 NOTE — PROGRESS NOTES
OBGYN Attending Labor Progress Note    S: sitting in throne, comfortable w epidural.    O:  /57 (Cuff Size: Adult Regular)   Temp 97.9  F (36.6  C) (Oral)   Resp 16   LMP 2023   SpO2 98%   Gen: NAD  SVE: 8/100/0, copious clear fluid  FHT: 140/moderate jannette/+accels/+jannette decels w contractions  [place]: q3-4m    A/P:   Soraida Johnson is a 33 year old  at 39w1d admitted for IOL.    -labor progressing. FHT cat 2, low suspicion for fetal acidemia given good variability and accels. Anticipate .    Chayito Loo MD, MSCI    [facility]/OBGYN  24

## 2024-05-24 NOTE — PROGRESS NOTES
[facility]  OB Labor Progress Note    S: Patient is feeling comfortable with epidural.     O:   /73   Temp 98.3  F (36.8  C) (Oral)   Resp 15   LMP 2023   Gen: alert, no acute distress    Cervix: 5 / 50% / -2, ballotable  Membranes: intact    FHT: Baseline 135 bpm, moderate variability, accelerations present, decelerations absent  [place]: 2-3 contractions in 10 minutes    A/P: 33 year old  at 39w1d admitted for eIOL.    # Labor  Labor course: 3/50/-1 ()> pit ()> 50/-2, ballotable (245)  Fetal heart tracing: category 1  - Management: continue present management with IV Pitocin  - GBS negative: no antibiotics indicated  - Continuous fetal monitoring, intrauterine resuscitation PRN  - Pain: per patient preference, epidural in place  - PPH ppx: avoid methergine    Discussed AROM when fetal head more engaged with the cervix, and patient is amenable.    Shirley Robin MD  Obstetrics & Gynecology, PGY-2  2024 2:46 AM

## 2024-05-24 NOTE — H&P
[facility]  OB History and Physical   May 24, 2024  Soraida Johnson  [id number]    HPI: Soraida Johnson is a 33 year old  at 39w1d by LMP c/w 6w4d US who presents for eIOL.    She states that she is feeling well today.  She denies vaginal bleeding or loss of fluid and is feeling normal fetal movement. She denies headache, vision changes, chest pain, shortness of breath, fever, chills, nausea, vomiting or other systemic complaints. No dysuria, changes in vaginal discharge, or edema in extremities noted.      OB History   OB History    Para Term  AB Living   2 1 1 0 0 1   SAB IAB Ectopic Multiple Live Births   0 0 0 0 1      # Outcome Date GA Lbr Hayden/2nd Weight Sex Type Anes PTL Lv   2 Current            1 Term 22 39w1d 07:41 / 00:40 3.544 kg (7 lb 13 oz) M Vag-Spont EPI N ROSALIE      Name: Michael      Apgar1: 8  Apgar5: 9     Past Medical History  Past Medical History:   Diagnosis Date    Depression     escitalopram 10 mg daily    Gestational hypertension 2022     Past Surgical History  Past Surgical History:   Procedure Laterality Date    NO HISTORY OF SURGERY       Medications   Prior to Admission medications    Medication Sig Last Dose Taking? Auth Provider Long Term End Date   escitalopram (LEXAPRO) 5 MG tablet Take 1 tablet (5 mg) by mouth daily  Patient taking differently: Take 10 mg by mouth daily 10 mg 2024 Yes Ania Mcclendon MD Yes    Prenatal Vit-DSS-Fe Cbn-FA (PRENATAL AD PO)  2024 Yes Reported, Patient     doxylamine (UNISOM) 25 MG TABS tablet Take 1 tablet (25 mg) by mouth at bedtime  Patient not taking: Reported on 2024   Fay Alatorre MD     levonorgestrel (MIRENA) 20 MCG/DAY IUD 1 each (20 mcg) by Intrauterine route once  Patient not taking: Reported on 2024   Fay Alatorre MD Yes    pyridOXINE (VITAMIN B6) 25 MG tablet Take 1 tablet (25 mg) by mouth daily  Patient not  taking: Reported on 2024   Fay Alatorre MD       Allergies  No Known Allergies  Family History  Family History   Problem Relation Age of Onset    No Known Problems Mother     Hyperlipidemia Father         high triglycerides    Anxiety Disorder Sister      Social History   Social History     Tobacco Use    Smoking status: Never    Smokeless tobacco: Never   Substance Use Topics    Alcohol use: Yes     Comment: a couple per week    Drug use: Never     Physical Exam  Vitals:    24 1919 24 1933 24 1948 24 2111   BP: (!) 144/86 (!) 147/88 (!) 150/86 (!) 153/94   BP Location:   Left arm    Patient Position:   Semi-Culp's    Cuff Size:   Adult Regular    Resp:   15    Temp:   98.3  F (36.8  C)    TempSrc:   Oral      General: alert, oriented female, resting in bed in NAD  CV: regular rate and rhythm  Lungs: normal rate and work of breathing on room air  Abdomen: soft, gravid, non-tender to palpation    Cervix: 3 / 50% / -1  Membranes: intact  Presentation: cephalic by bedside US    FHT: baseline 135 bpm, moderate variability, accelerations present, decelerations absent  [place]: 1-2 contractions per 10 minutes    Prenatal Labs & Imaging:  Rh positive, antibody negative  Rubella immune, Hepatitis B immune  Hepatitis B sAg NR, HIV NR, RPR negative  Hgb A1c 5.2%  GC/Chlam negative, GBS negative  Genetic screening: NIPT low risk    A/P: 33 year old  at 39w1d who presents for eIOL. Will admit to Labor & Delivery.    # Induction of Labor  - Management: IV Pitocin  - Labs: CBC, T&S, RPR  - GBS negative: no antibiotics indicated  - Pain control: per patient preference, options discussed  --- Vistaril and IM Morphine for therapeutic sleep PRN  --- Desires epidural in active labor  - PPH meds: avoid methergine with newly elevated BP today  - DVT PPx: SCDs for prolonged periods of immobility  - Diet: regular    # Elevated BPs  - BP: currently high mild range  -- Continue serial BP  monitoring  -- IV antihypertensives prn for sustained severe range blood pressures (>160/>110)  - Symptoms: none  - Labs: HELLP labs, UPC ordered  - Daily weights, strict I&Os    # Prenatal Care  - Rh positive, Rubella immune, GBS negative    # Fetal Well Being  Category I FHT, reactive.  - Continuous fetal monitoring  - Intrauterine resuscitative measures PRN    Patient staffed with Dr. Alatorre.    Shirley Robin MD  Obstetrics & Gynecology, PGY-2  5/24/2024 12:57 AM     I personally examined and evaluated Soraida Johnson on 5/23/2024.  I discussed the patient with Dr. Robin and agree with the presentation, exam and plan of care documented in this note with edits by me.   Fay Alatorre MD MPH

## 2024-05-24 NOTE — PROGRESS NOTES
Intrapartum Progress Note     S: feeling more contractions now        O:  BP (!) 153/94   Temp 98.3  F (36.8  C) (Oral)   Resp 15   LMP 2023   Patient Vitals for the past 8 hrs:   BP Temp Temp src Resp   24 2111 (!) 153/94 -- -- --   24 194 (!) 150/86 98.3  F (36.8  C) Oral 15   24 1933 (!) 147/88 -- -- --   24 191 (!) 144/86 -- -- --   24 190 (!) 162/87 -- -- --   24 1848 (!) 153/89 -- -- --   24 1835 (!) 149/81 -- -- --   24 1820 (!) 164/89 -- -- --   24 1803 (!) 154/85 98.1  F (36.7  C) Oral --     No intake/output data recorded.     Exam    Gen: bouncing on ball        Membranes: intact    Cervix: deferred      FHT:140s, mod jannette, accels    [place]: q5-10    A/P:  Soraida Johnson is a 33 year old  at 39w0d, IOL by patient request.    -- FHT Assessment: Cat 1  -- Labor: Cont pit  -- gHTN: monitor for signs/sx Martin Alatorre MD MPH

## 2024-05-24 NOTE — PROGRESS NOTES
[facility]  OB Labor Progress Note    S: Patient is feeling comfortable with epidural. Would like SVE.    O:   /73   Temp 98.3  F (36.8  C) (Oral)   Resp 15   LMP 2023   Gen: alert, no acute distress    Cervix: 5 / 50% / -2, ballotable  Membranes: intact    FHT: Baseline 135 bpm, moderate variability, accelerations present, decelerations absent  [place]: 2-3 contractions in 10 minutes    A/P: 33 year old  at 39w1d admitted for eIOL.    # Labor  Labor course: 3/50/-1 ()> pit ()> /50/-2, ballotable (0245, 0500)  Fetal heart tracing: category 1  - Management: continue present management with IV Pitocin  - GBS negative: no antibiotics indicated  - Continuous fetal monitoring, intrauterine resuscitation PRN  - Pain: per patient preference, epidural in place  - PPH ppx: avoid methergine    Discussed AROM when fetal head more engaged with the cervix, and patient is amenable. Exam unchanged from previous.    Shirley Robin MD  Obstetrics & Gynecology, PGY-2  2024 5:11 AM

## 2024-05-24 NOTE — PROGRESS NOTES
AROM at 0900 with small amount of clear fluid. More bloody show noted. Pt reports now that contractions feel much stronger. Using PCA button. Pt tolerating frequent position changes well.

## 2024-05-24 NOTE — PROGRESS NOTES
Pt reported significant pain with contractions. Anesthesia notified and came bedside and gave epidural bolus. Pt got great relief.

## 2024-05-24 NOTE — PROVIDER NOTIFICATION
05/23/24 1904   Vital Signs   Oximeter Heart Rate 111 bpm   BP (!) 162/87     Notified Dr. Robin about pt having a second severe range and not treated. Provider aware and ok with continuing to monitor the BP's and if more severe range, then will go from there.

## 2024-05-24 NOTE — PROGRESS NOTES
Vaginal Delivery Note   of viable Male with Dr. Loo and Dr. CONNER Jimenez in attendance.  Nursery RN Sarahi present.  Infant with spontaneous cry, to mother's abdomen, dried and stimulated.  APGAR at 1 minute:  9 and APGAR at 5 minutes:  9.  Placenta delivered with out complication, IV pitocin initiated after delivery of infant, 2nd degree laceration, with 3-0 vicryl suture repair, merced cares provided.  Mother and baby in stable condition.

## 2024-05-25 LAB — HGB BLD-MCNC: 10.6 G/DL (ref 11.7–15.7)

## 2024-05-25 PROCEDURE — 120N000002 HC R&B MED SURG/OB UMMC

## 2024-05-25 PROCEDURE — 99232 SBSQ HOSP IP/OBS MODERATE 35: CPT | Mod: GC | Performed by: OBSTETRICS & GYNECOLOGY

## 2024-05-25 PROCEDURE — 250N000013 HC RX MED GY IP 250 OP 250 PS 637

## 2024-05-25 PROCEDURE — 85018 HEMOGLOBIN: CPT

## 2024-05-25 PROCEDURE — 36415 COLL VENOUS BLD VENIPUNCTURE: CPT

## 2024-05-25 PROCEDURE — 0KQM0ZZ REPAIR PERINEUM MUSCLE, OPEN APPROACH: ICD-10-PCS | Performed by: OBSTETRICS & GYNECOLOGY

## 2024-05-25 RX ADMIN — IBUPROFEN 800 MG: 800 TABLET, FILM COATED ORAL at 14:52

## 2024-05-25 RX ADMIN — ACETAMINOPHEN 650 MG: 325 TABLET, FILM COATED ORAL at 08:15

## 2024-05-25 RX ADMIN — DOCUSATE SODIUM 100 MG: 100 CAPSULE, LIQUID FILLED ORAL at 08:15

## 2024-05-25 RX ADMIN — IBUPROFEN 800 MG: 800 TABLET, FILM COATED ORAL at 20:52

## 2024-05-25 RX ADMIN — ACETAMINOPHEN 650 MG: 325 TABLET, FILM COATED ORAL at 19:20

## 2024-05-25 RX ADMIN — ACETAMINOPHEN 650 MG: 325 TABLET, FILM COATED ORAL at 14:52

## 2024-05-25 RX ADMIN — ACETAMINOPHEN 650 MG: 325 TABLET, FILM COATED ORAL at 04:04

## 2024-05-25 RX ADMIN — ESCITALOPRAM OXALATE 10 MG: 5 TABLET, FILM COATED ORAL at 08:15

## 2024-05-25 RX ADMIN — IBUPROFEN 800 MG: 800 TABLET, FILM COATED ORAL at 00:05

## 2024-05-25 RX ADMIN — IBUPROFEN 800 MG: 800 TABLET, FILM COATED ORAL at 08:15

## 2024-05-25 ASSESSMENT — ACTIVITIES OF DAILY LIVING (ADL)
ADLS_ACUITY_SCORE: 22
ADLS_ACUITY_SCORE: 21
ADLS_ACUITY_SCORE: 20
ADLS_ACUITY_SCORE: 22
ADLS_ACUITY_SCORE: 21
ADLS_ACUITY_SCORE: 20
ADLS_ACUITY_SCORE: 20
ADLS_ACUITY_SCORE: 22
ADLS_ACUITY_SCORE: 21
ADLS_ACUITY_SCORE: 22
ADLS_ACUITY_SCORE: 20
ADLS_ACUITY_SCORE: 22
ADLS_ACUITY_SCORE: 20
ADLS_ACUITY_SCORE: 20
ADLS_ACUITY_SCORE: 22
ADLS_ACUITY_SCORE: 20
ADLS_ACUITY_SCORE: 18
ADLS_ACUITY_SCORE: 22
ADLS_ACUITY_SCORE: 18
ADLS_ACUITY_SCORE: 22
ADLS_ACUITY_SCORE: 21

## 2024-05-25 NOTE — PROGRESS NOTES
Data: Soraida Johnson transferred to [facility] via wheelchair at 1945. Baby transferred via parent's arms.  Action: Receiving unit notified of transfer: Yes. Patient and family notified of room change. Report given to FIDEL Thompson at 1930. Belongings sent to receiving unit. Accompanied by Registered Nurse. Oriented patient to surroundings. Call light within reach. ID bands double-checked with receiving RN.  Response: Patient tolerated transfer and is stable.

## 2024-05-25 NOTE — PROGRESS NOTES
[facility]   Post-partum Progress Note    Name:  Soraida Johnson  MRN: [id number]    S:   Patient reports feeling well.  Pain well controlled.  Tolerating regular diet without nausea or vomiting.  Ambulating without dizziness.  Voiding spontaneously. Has passed flatus. Lochia similar to menstrual flow. Denies fever/chills, headache, vision changes, chest pain, shortness of breath, RUQ pain, increased edema. Breast feeding.  Desires IUD at 6 week pp visit for contraception.  Plans discharge today.    O:   Patient Vitals for the past 24 hrs:   BP Temp Temp src Pulse Resp Weight   24 0605 -- -- -- -- -- 77.4 kg (170 lb 9.6 oz)   24 0050 93/67 97.7  F (36.5  C) Oral 67 16 --   24 2114 115/77 -- Oral 74 16 --   24 1530 105/59 97.8  F (36.6  C) Oral -- 16 --     Gen:  Resting comfortably, NAD  CV:  RRR  Pulm:  Non-labored breathing. No cough or audible wheezing.   Abd:  Soft, appropriately tender to palpation, non-distended.  Fundus below umbilicus, firm, and non-tender.  Ext:  Non-tender, 1+ LE edema b/l    Hgb:   Recent Labs   Lab 24  0728 24  1840   HGB 10.6* 10.7*     A/P:  33 year old  PPD#2 s/p . Pregnancy notable for gHTN. Meeting postpartum goals and ready for discharge. Continue with routine postpartum management.     #Routine Postpartum  Pain: Well-controlled with scheduled tylenol, ibuprofen,   Hgb: 10.7 >  (OK miso given at delivery)> 10.6. VSS as noted above, asymptomatic.  GI:  PRN Senna/Colace. PRN Simethicone, Miralax. Regular diet. Encourage hydration and ambulation.   PPx:  Encouraged ambulation   Rh: Positive  Rub:  Immune  Hep B: Surface antigen negative  Baby: In room, doing well  Feed: Breastfeeding well  BC: IUD at 6 week pp visit  Dispo: Plan for home today.    #gHTN  - Enroll in HOPE-BP trial  - BP normotensive, asymptomatic.     Patient discussed with Dr. Dave Ackerman MD  Obstetrics and Gynecology,  PGY-1  05/26/2024 8:10 AM     /79   Pulse 82   Temp 97.7  F (36.5  C) (Oral)   Resp 16   Wt 77.4 kg (170 lb 9.6 oz)   LMP 08/24/2023   SpO2 100%   Breastfeeding Unknown   BMI 26.72 kg/m    Hemoglobin   Date Value Ref Range Status   05/25/2024 10.6 (L) 11.7 - 15.7 g/dL Final      Final   The patient was seen and examined by me separately from the team.  I have reviewed and agree with the above note.  She is feeling well this morning with no concerns.  She declines the HOPE-BP program-has a cuff at home and will keep and eye on her blood pressure.  RTC for routine postpartum visits.     Galilea Acosta MD, FACOG

## 2024-05-25 NOTE — PROGRESS NOTES
[facility]   Post-partum Progress Note    Name:  Soraida Johnson  MRN: [id number]    S:   Patient reports feeling well.  Pain well controlled.  Tolerating regular diet without nausea or vomiting.  Ambulating without dizziness.  Voiding spontaneously. Has  passed flatus; has not yet had bowel movement. Lochia similar to menstrual flow. Denies fever/chills, headache, vision changes, chest pain, shortness of breath, RUQ pain, increased edema. Breast feeding.  Desires IUD at 6 week pp visit for contraception.  Plans discharge PPD#1-2.    O:   Patient Vitals for the past 24 hrs:   BP Temp Temp src Pulse Resp SpO2   05/25/24 0809 116/70 97.8  F (36.6  C) Oral 83 16 --   05/25/24 0400 112/67 97.7  F (36.5  C) Oral 80 16 100 %   05/25/24 0005 120/73 97.7  F (36.5  C) Oral 84 16 100 %   05/24/24 1955 138/89 98.1  F (36.7  C) Oral 85 16 99 %   05/24/24 1932 120/67 -- -- -- -- --   05/24/24 1915 112/62 -- -- -- 16 99 %   05/24/24 1900 128/74 -- -- -- 16 --   05/24/24 1845 129/73 97.7  F (36.5  C) Oral -- 16 98 %   05/24/24 1830 115/70 -- -- -- 16 98 %   05/24/24 1825 -- -- -- -- -- 97 %   05/24/24 1820 -- -- -- -- -- 98 %   05/24/24 1815 134/79 -- -- -- 18 98 %   05/24/24 1810 -- -- -- -- -- 97 %   05/24/24 1805 -- -- -- -- -- 97 %   05/24/24 1800 123/69 -- -- -- 16 98 %   05/24/24 1745 137/79 -- -- -- 16 --   05/24/24 1730 120/79 -- -- -- 18 --   05/24/24 1651 -- -- -- -- -- 96 %   05/24/24 1646 -- -- -- -- -- 99 %   05/24/24 1645 -- -- -- -- -- 93 %   05/24/24 1641 -- -- -- -- -- 96 %   05/24/24 1636 -- -- -- -- -- 99 %   05/24/24 1631 -- -- -- -- -- 98 %   05/24/24 1629 124/74 -- -- -- 18 --   05/24/24 1626 -- -- -- -- -- 99 %   05/24/24 1625 131/78 -- -- -- 16 --   05/24/24 1621 -- -- -- -- -- 99 %   05/24/24 1619 120/77 -- -- -- 16 --   05/24/24 1616 -- -- -- -- -- 100 %   05/24/24 1614 127/78 -- -- -- 18 --   05/24/24 1611 -- -- -- -- -- 96 %   05/24/24 1608 107/65 -- -- -- 18 --   05/24/24  1606 -- -- -- -- -- 97 %   24 1604 111/67 -- -- -- 16 --   24 1601 -- -- -- -- -- 98 %   24 1558 105/60 -- -- -- 16 --   24 1556 112/60 -- -- -- 18 98 %   24 1554 113/63 -- -- -- 16 --   24 1552 112/67 -- -- -- 18 --   24 1551 -- -- -- -- -- 99 %   24 1550 113/66 -- -- -- 16 --   24 1545 118/67 97.6  F (36.4  C) -- -- 20 99 %   24 1430 132/79 -- -- -- 18 99 %   24 1255 131/66 97.6  F (36.4  C) Oral -- 18 --   24 1250 -- -- -- -- -- 98 %   24 1140 117/57 97.9  F (36.6  C) Oral -- 16 --   24 1030 109/59 -- -- -- 18 --   24 1025 -- -- -- -- -- 98 %     Gen:  Resting comfortably, NAD  CV:  RRR  Pulm:  Non-labored breathing. No cough or audible wheezing.   Abd:  Soft, appropriately tender to palpation, non-distended.  Fundus below umbilicus, firm, and non-tender.  Ext:  Non-tender, 1+ LE edema b/l    Hgb:   Recent Labs   Lab 24  0728 24  1840   HGB 10.6* 10.7*     A/P:  33 year old  PPD#1 s/p . Pregnancy notable for gHTN. Meeting postpartum goals but still early on PPD#1. Continue with routine postpartum management.     #Routine Postpartum  Pain: Well-controlled with scheduled tylenol, ibuprofen,   Hgb: 10.7 >  (VA miso given at delivery)> 10.6. VSS as noted above, asymptomatic. Will discharge with PO Fe if hgb < 10  GI:  PRN Senna/Colace.  PRN Simethicone, Miralax. Regular diet. Encourage hydration and ambulation.   PPx:  Encouraged ambulation   Rh: Positive  Rub:  Immune  Hep B: Surface antigen negative  Baby: In room, doing well  Feed: Breast feeding  BC: IUD at 6 week pp visit  Dispo: Plan for home on PPD#2.    #gHTN  BP normotensive, asymptomatic.     Maria Victoria Jimenez MD  Obstetrics & Gynecology, PGY-2  2024 9:12 AM    I have seen and examined the patient without the resident. I have reviewed, edited, and agree with the note.   My findings are: appears well, working on BF. Anticipate discharge  tomorrow.   Hemoglobin   Date Value Ref Range Status   05/25/2024 10.6 (L) 11.7 - 15.7 g/dL Final   05/23/2024 10.7 (L) 11.7 - 15.7 g/dL Final   01/03/2020 13.5 11.7 - 15.7 g/dL Final   12/13/2019 13.8 11.7 - 15.7 g/dL Final     Acute blood loss anemia -- stable and asymptomatic.   Mary Jo Martinez MD

## 2024-05-25 NOTE — PLAN OF CARE
Goal Outcome Evaluation:      Plan of Care Reviewed With: patient          Problem: Postpartum (Vaginal Delivery)  Goal: Optimal Pain Control and Function  Outcome: Progressing        VSS. AFebrile. Up ad nereida. Voiding and passing gas. C/o minimal pain and medicated with relief. Breast feedng with assistance and SNS at the breast to help entice baby to sustain a latch.  FOB here and attentive. Tolerating regular diet. Pumping started. Hand expressed and only got 1-2 drops for baby.  Will continue to monitor.

## 2024-05-25 NOTE — DISCHARGE SUMMARY
[facility] Discharge Summary    Soraida Johnson MRN# [id number]   Age: 33 year old YOB: 1990     Date of Admission:  2024  Date of Discharge::  24   Admitting Physician:  Fay Alatorre MD  Discharge Physician:  Galilea Acosta MD          Admission Diagnoses:   - IUP at 39w1d  - Gestational Hypertension          Discharge Diagnosis:     - IUP at 39w1d, now delivered          Procedures:     Procedure(s): -   - Epidural          Medications Prior to Admission:     Medications Prior to Admission   Medication Sig Dispense Refill Last Dose    Prenatal Vit-DSS-Fe Cbn-FA (PRENATAL AD PO)    2024    doxylamine (UNISOM) 25 MG TABS tablet Take 1 tablet (25 mg) by mouth at bedtime (Patient not taking: Reported on 2024) 60 tablet 4     pyridOXINE (VITAMIN B6) 25 MG tablet Take 1 tablet (25 mg) by mouth daily (Patient not taking: Reported on 2024) 60 tablet 4     [DISCONTINUED] escitalopram (LEXAPRO) 5 MG tablet Take 1 tablet (5 mg) by mouth daily (Patient taking differently: Take 10 mg by mouth daily 10 mg) 90 tablet 3 2024    [DISCONTINUED] levonorgestrel (MIRENA) 20 MCG/DAY IUD 1 each (20 mcg) by Intrauterine route once (Patient not taking: Reported on 2024)                Discharge Medications:        Review of your medicines        UNREVIEWED medicines. Ask your doctor about these medicines        Dose / Directions   doxylamine 25 MG Tabs tablet  Commonly known as: UNISOM  Used for: Vomiting or nausea of pregnancy      Dose: 25 mg  Take 1 tablet (25 mg) by mouth at bedtime  Quantity: 60 tablet  Refills: 4     pyridOXINE 25 MG tablet  Commonly known as: VITAMIN B6  Used for: Vomiting or nausea of pregnancy      Dose: 25 mg  Take 1 tablet (25 mg) by mouth daily  Quantity: 60 tablet  Refills: 4            START taking        Dose / Directions   acetaminophen 325 MG tablet  Commonly known as: TYLENOL  Used for:  (spontaneous vaginal delivery)       Dose: 650 mg  Take 2 tablets (650 mg) by mouth every 6 hours as needed for mild pain Start after Delivery.  Quantity: 100 tablet  Refills: 0     ibuprofen 600 MG tablet  Commonly known as: ADVIL/MOTRIN  Used for:  (spontaneous vaginal delivery)      Dose: 600 mg  Take 1 tablet (600 mg) by mouth every 6 hours as needed for moderate pain Start after delivery  Quantity: 60 tablet  Refills: 0     senna-docusate 8.6-50 MG tablet  Commonly known as: SENOKOT-S/PERICOLACE  Used for:  (spontaneous vaginal delivery)      Dose: 1 tablet  Take 1 tablet by mouth daily Start after delivery.  Quantity: 100 tablet  Refills: 0            CONTINUE these medicines which have NOT CHANGED        Dose / Directions   escitalopram 5 MG tablet  Commonly known as: LEXAPRO  Used for: Mild depression      Dose: 10 mg  Take 2 tablets (10 mg) by mouth daily 10 mg  Refills: 0     PRENATAL AD PO      Refills: 0            STOP taking      levonorgestrel 52 MG (20 mcg/day) IUD  Commonly known as: MIRENA                  Where to get your medicines        These medications were sent to [facility] [address]  [address] [address]      Phone: [phone]   acetaminophen 325 MG tablet  ibuprofen 600 MG tablet  senna-docusate 8.6-50 MG tablet                 Consultations:   - Anesthesia  - Lactation          Brief Admission History and Intrapartum course   Soraida Johnson is a 33 year old now  who presented at 39w1d for elective IOL.  Pregnancy was notable for gHTN. Her IOL began with pitocin. She was augmented with AROM.  Labor course was overall uncomplicated.  She progressed to complete and pushed for about 20 minutes, after which she had a spontaneous vaginal delivery of a viable male infant in SHINE position.  A single loose nuchal cord was noted and reduced at delivery.  Apgars of 9 and 9 with weight of 3990 grams.  The cord was double clamped after 60 seconds  and cut.  A cord segment and cord blood were obtained.  40U of IV pitocin was started. Controlled cord traction was placed on the placenta to facilitate delivery, however the placenta did not deliver or begin to deliver for close to 30 minutes. A hand was placed in the cervix/lower uterine segment and the presenting edge of the placenta was grasped; firm traction was slowly applied and gradually the placenta delivered. The placenta was noted to be intact. The uterus was noted to be firm after fundal massage.  The perineum was assessed for lacerations and a second degree laceration was noted.  The laceration was repaired in standard fashion using 3-0 Vicryl suture.  On final examination of the perineum, the repair was noted to be hemostatic.  Total QBL was 573mL.  The placenta appeared intact with a 3V umbilical cord            Hospital Course:   The patient's hospital course was unremarkable.  On discharge, her pain was well controlled. Vaginal bleeding is similar to peak menstrual flow.  Voiding without difficulty.  Ambulating well and tolerating a normal diet.  No fever.  Breastfeeding well.  Infant is stable.  No bowel movement yet.*  She was discharged on post-partum day #2.    Post-partum hemoglobin: 10.6          Discharge Instructions and Follow-Up:     Discharge diet: Regular   Discharge activity: Pelvic rest for 6 weeks including no sexual intercourse, tampons, or douching.    Discharge follow-up: Follow up with your primary OB for a routine postpartum visit in 6 weeks  Follow up with your primary OB clinic in 3-5 days for a BP check           Discharge Disposition:     Discharged to home in stable condition      Plan discussed with Dr. Dave Ackerman MD  Obstetrics and Gynecology, PGY-1  05/26/2024 9:16 AM      The patient was seen and examined by me on the day of discharge.  I have reviewed and agree with the resident's above note.    Galilea Acosta MD, FACOG

## 2024-05-25 NOTE — LACTATION NOTE
"This note was copied from a baby's chart.  Consult for:  second baby, pinching and painful latch, history of low milk supply    Infant Name: Pietro    Infant's Primary Care Clinic: [facility]     Delivery Information:  Pietro was born at Gestational Age: 39w1d via vaginal delivery on 5/24/2024 5:04 PM, 18 hours old at time of first visit.    Maternal Health History:    Information for the patient's mother:  Soraida Johnson [[id number]]     Patient Active Problem List   Diagnosis    Encounter for supervision of other normal pregnancy in third trimester    Multigravida of advanced maternal age in second trimester    Pregnancy     and   Information for the patient's mother:  Soraida Johnson [[id number]]     Medications Prior to Admission   Medication Sig Dispense Refill Last Dose    escitalopram (LEXAPRO) 5 MG tablet Take 1 tablet (5 mg) by mouth daily (Patient taking differently: Take 10 mg by mouth daily 10 mg) 90 tablet 3 5/23/2024    Prenatal Vit-DSS-Fe Cbn-FA (PRENATAL AD PO)    5/23/2024    doxylamine (UNISOM) 25 MG TABS tablet Take 1 tablet (25 mg) by mouth at bedtime (Patient not taking: Reported on 5/8/2024) 60 tablet 4     levonorgestrel (MIRENA) 20 MCG/DAY IUD 1 each (20 mcg) by Intrauterine route once (Patient not taking: Reported on 5/8/2024)       pyridOXINE (VITAMIN B6) 25 MG tablet Take 1 tablet (25 mg) by mouth daily (Patient not taking: Reported on 5/8/2024) 60 tablet 4           Maternal Breast Exam:  Soraida noted some discomfort in breasts early in pregnancy, and darker areola though no real change in breast size. She reports she never had sensation of \"milk coming in,\" denies any notable change in breast fullness, heaviness or density in the days/week after delivery. She denies any history of breast/chest injury or surgery but wonders about possible hypoplasia of breasts, if that could be what caused her to have low supply last time. Breasts are soft, " "conical shaped with less mammary tissue on inferior aspect of breast. Mostly symmetric with bilateral intact, everted nipples. She was able to hand express colostrum during visit. Encouraged to ask OB if Soraida wants assessment about possible hypoplasia/IGT, endorsed if she did have that, particularly with no breast changes in pregnancy that it could be risk factor for low milk supply. Sometimes even when doing everything right, the body will only respond with so much. Reinforced that she worked hard triple feeding that first month and there was nothing she did \"wrong\" that caused low milk supply with first baby! ?    Breastfeeding/ Lactation History: She  her first child for about a month, triple feeding the entire time then when supply not increasing, switched to formula. She would get from 10 mL to 2 ounces (1st morning pump without bf first) per pump.     Oral exam of baby:  Pietro has normal jaw and palate, good length of tongue palpable beyond lingual frenulum attachment, extends well beyond lower gum ridge & organized when sucking on finger.     Feeding History: Breastfeeds well but significant pinching when he latches, Soraida remembers this also from her first time breastfeeding felt like she could not get comfortable latch, eventually was tolerable but never pain free.     Feeding Assessment:  Baby eager and latches well, pinching on initial latch demo and mom return demo ways to flange out lower lip and/or tuck in more of areola on top. Also demo and she return demo how to shape areola and keep her fingers back behind lower areola, aiming this part into his mouth first so tongue ends up on areola instead of nipple. Practiced two different positions at two feeds today, mom was able to get him on independently, deep latch and reports comfortable both times. Second visit they had 5 mL formula in SNS tubing since baby was pulling off and crying several times, offering little bits to him to entice " "sustaining latch which worked well. They only ended up using 2 of the 5 mL, baby fed well both sides and went back to first side again. Demo for parents how to use SNS bottle (small size, wash and reuse for 24 hours) if this is something they'd like as option - shared the larger size available for longer term use if desired (larger bottle, comes with two tubes and able to wash and reuse for up to 30 days).     Education:   [x] Expected  feeding patterns in the first few days (pg. 38 of Your Guide to To Postpartum and  Care)/ the Second Night  [x] Stages of milk production  [x] Benefits of and how to do hand expression, encouraged after most feedings in first few days until milk is \"in.\"  [x] Early feeding cues     [x] Benefits of feeding on cue  [x] Benefits of skin to skin  [x] Breastfeeding positions  [x] Tips to get and maintain a deep latch  [x] Nutritive vs.non-nutritive sucking  [x] Gentle breast compressions as needed to enhance milk transfer  [x] How to tell when baby is finished  [x] How to tell if baby is getting enough  [x] Expected  output  [x] [place] weight loss  [x] Infant Feeding Log  [] [employer] Breastfeeding/Pumping videos  [x] Signs breastfeeding is going well (comfortable latch, audible swallows, age appropriate output and weight loss)    [] Tips to prevent engorgement  [x] Signs of engorgement  [] Tips to manage engorgement  [x] Pumping recommendations (Encouraged 4-6 times/day as extra stimulation to help maximize supply and follow up with outpatient LC within a week or so to help assess supply and milk transfer.)  [] CDC breast pump part/infant feeding supplies cleaning recommendations  [x] Inpatient breastfeeding support  [x] Outpatient lactation resources    Handouts: Infant Feeding Log (Week 1, Your Guide to Postpartum & [place] Care Book) and [facility] Lactation Resources    Home Breast Pump: did not discuss, will recommend [employer] rental pump to help " maximize supply.    Plan: Continue breastfeeding on cue with RN support as needed, goal of 8-12 feedings per day.     Encourage frequent skin to skin and hand expression.     Encouraged follow up with outpatient lactation consultant  within 1 week after discharge (history of low supply and no breast changes in pregnancy). Family plans to follow up with [facility].      Sophie Koch RN, IBCLC   Lactation Consultant  [place]: [facility] [phone]  Office: [phone]

## 2024-05-25 NOTE — L&D DELIVERY NOTE
OB Vaginal Delivery Note    Soraida Johnson MRN# [id number]   Age: 33 year old YOB: 1990     L&D Delivery Note:     Soraida Johnson is a 33 year old now  who presented at 39w1d for elective IOL.  Pregnancy was notable for gHTN. Her IOL began with pitocin. She was augmented with AROM.  Labor course was overall uncomplicated.  She progressed to complete and pushed for about 20 minutes, after which she had a spontaneous vaginal delivery of a viable male infant in SHINE position.  A single loose nuchal cord was noted and reduced at delivery.  Apgars of 9 and 9 with weight of 3990 grams.  The cord was double clamped after 60 seconds and cut.  A cord segment and cord blood were obtained.  40U of IV pitocin was started. Controlled cord traction was placed on the placenta to facilitate delivery, however the placenta did not deliver or begin to deliver for close to 30 minutes. A hand was placed in the cervix/lower uterine segment and the presenting edge of the placenta was grasped; firm traction was slowly applied and gradually the placenta delivered. The placenta was noted to be intact. The uterus was noted to be firm after fundal massage.  The perineum was assessed for lacerations and a second degree laceration was noted.  The laceration was repaired in standard fashion using 3-0 Vicryl suture.  On final examination of the perineum, the repair was noted to be hemostatic.  Total QBL was 573mL.  The placenta appeared intact with a 3V umbilical cord.  Dr. Loo was present for the entire procedure.     Maria Victoria Ernst MD  Obstetrics & Gynecology, PGY-2  2024 6:43 AM      GA: 39w1d  GP:   Labor Complications:    EBL:   mL  Delivery QBL: 60 mL  Delivery Type: Vaginal, Spontaneous   ROM to Delivery Time: (Delivered) Hours: 8 Minutes: 3   Weight: 3.99 kg (8 lb 12.7 oz)    1 Minute 5 Minute 10 Minute   Apgar Totals: 9   9        MARIA VICTORIA ERNST;JUSTIN SALMON;HANNAH  MAURICIO STILL     Delivery Details:  Soraida Johnson, a 33 year old  female delivered a viable infant with apgars of 9  and 9 . Patient was fully dilated and pushing after 7  hours 39  minutes in active labor. Delivery was via vaginal, spontaneous  to a sterile field under epidural  anesthesia. Infant delivered in         position. Anterior and posterior shoulders delivered without difficulty. The cord was clamped, cut twice and   were noted. Cord blood was obtained in routine fashion with the following disposition:  .      Cord complications:    Placenta delivered at 2024  5:26 PM . Placental disposition was  . Fundal massage performed and fundus found to be firm.     Episiotomy:     Perineum, vagina, cervix were inspected, and the following lacerations were noted:   Perineal lacerations:                 Any lacerations were repaired in the usual fashion using 3-0 vicryl suture.    Excellent hemostasis was noted. Needle count correct. Infant and patient in delivery room in good and stable condition.        Leanne Johnson [[id number]]      Labor Event Times      Latent labor onset date/time: 2024    Active labor onset date: 24 Onset time:  8:59 AM   Dilation complete date: 24 Complete time:  4:38 PM   Start pushing date/time: 2024 1641          Labor Length      1st Stage (hrs): 7 (min): 39   2nd Stage (hrs): 0 (min): 26   3rd Stage (hrs): 0 (min): 22          Labor Events     labor?: No   steroids: None  Labor Type: Induction/Cervical ripening  Predominate monitoring during 1st stage: continuous electronic fetal monitoring     Antibiotics received during labor?: No       Rupture date/time: 24 0901   Rupture type: Artificial Rupture of Membranes  Fluid color: Clear  Fluid odor: Normal       Induction date/time: 24    Cervical ripening date/time:             Delivery/Placenta Date and Time      Delivery Date: 24 Delivery Time:  5:04  "PM   Placenta Date/Time: 2024  5:26 PM  Oxytocin given at the time of delivery: after delivery of baby  Delivering clinician: Chayito Loo MD   Other personnel present at delivery:  Provider Role   Maria Victoria Jimenez MD Resident   Anjali Cristina, RN Delivery Nurse   Sarahi Young RN Registered Nurse             Vaginal Counts       Initial count performed by 2 team members:  Two Team Members   Dr. Eze Cristina         Needles Suture Needles Sponges (RETIRED) Instruments   Initial counts 2 0 5    Added to count  1     Relief counts       Final counts 2 1 5            Placed during labor Accounted for at the end of labor   FSE No NA   IUPC No NA   Cervidil No NA                  Final count performed by 2 team members:  Two Team Members   Dr. CONNER Cristina      Final count correct?: Yes  Pre-Birth Team Brief: Complete  Post-Birth Team Debrief: Complete       Apgars    Living status: Living   1 Minute 5 Minute 10 Minute 15 Minute 20 Minute   Skin color: 1  1       Heart rate: 2  2       Reflex irritability: 2  2       Muscle tone: 2  2       Respiratory effort: 2  2       Total: 9  9       Apgars assigned by: SARAHI YOUNG RN        Resuscitation    Methods: None   Care at Delivery: ; to mother's abdomen; dried and stimulated. Lusty cry.  Output in Delivery Room: Stool       [place] Measurements      Weight: 8 lb 12.7 oz Length: 1' 9\"     Head circumference: 34.3 cm    Output in delivery room: Stool       Skin to Skin and Feeding Plan      Skin to skin initiation date/time: 1841    Skin to skin with: Mother  Skin to skin end date/time:            Delivery (Maternal) (Provider to Complete) ([id number])           Blood Loss  Mother: Soraida Johnson #[id number]     Start of Mother's Information      Delivery Blood Loss  24 0859 - 24 0643      Delivery QBL (mL) Hospital Encounter 573 mL    Total  573 mL               End of Mother's Information  " Mother: Soraida Johnson #[id number]                Delivery - Provider to Complete ([id number])    Delivering clinician: Chayito Loo MD  Delivery Type (Choose the 1 that will go to the Birth History): Vaginal, Spontaneous                         Other personnel:  Provider Role   Maria Victoria Jimenez MD Resident   Anjali Cristina, RN Delivery Nurse   Sarahi Segura RN Registered Nurse                    Placenta    Date/Time: 5/24/2024  5:26 PM             Anesthesia    Method: Epidural                           Maria Victoria Jimenez MD

## 2024-05-25 NOTE — PLAN OF CARE
Patient arrived to [facility] unit via wheelchair at 1950 ,with belongings, accompanied by spouse/ significant other, with infant in arms. Received report from  FIDEL Joshi  and checked bands. Unit and room orientation  completed . Call light given; no concerns present at this time. Continue with plan of care.

## 2024-05-25 NOTE — PLAN OF CARE
Goal Outcome Evaluation:      Plan of Care Reviewed With: patient    Overall Patient Progress: improving    Vital signs WDL. Postpartum checks: WDL. Pt eating and drinking without issue. Pt ambulating and voiding independently. Pt performing self-cares. Pt medicated for pain during the shift. Breast and formula fed. Support person present.

## 2024-05-26 VITALS
OXYGEN SATURATION: 100 % | WEIGHT: 170.6 LBS | RESPIRATION RATE: 16 BRPM | TEMPERATURE: 98 F | HEART RATE: 82 BPM | DIASTOLIC BLOOD PRESSURE: 79 MMHG | SYSTOLIC BLOOD PRESSURE: 132 MMHG | BODY MASS INDEX: 26.72 KG/M2

## 2024-05-26 PROBLEM — O13.9 GESTATIONAL HYPERTENSION: Status: ACTIVE | Noted: 2024-05-26

## 2024-05-26 PROCEDURE — 99238 HOSP IP/OBS DSCHRG MGMT 30/<: CPT | Mod: GC | Performed by: OBSTETRICS & GYNECOLOGY

## 2024-05-26 PROCEDURE — 250N000013 HC RX MED GY IP 250 OP 250 PS 637

## 2024-05-26 RX ORDER — ACETAMINOPHEN 325 MG/1
650 TABLET ORAL EVERY 6 HOURS PRN
Qty: 100 TABLET | Refills: 0 | Status: SHIPPED | OUTPATIENT
Start: 2024-05-26

## 2024-05-26 RX ORDER — ESCITALOPRAM OXALATE 5 MG/1
10 TABLET ORAL DAILY
COMMUNITY
Start: 2024-05-26

## 2024-05-26 RX ORDER — AMOXICILLIN 250 MG
1 CAPSULE ORAL DAILY
Qty: 100 TABLET | Refills: 0 | Status: SHIPPED | OUTPATIENT
Start: 2024-05-26

## 2024-05-26 RX ORDER — IBUPROFEN 600 MG/1
600 TABLET, FILM COATED ORAL EVERY 6 HOURS PRN
Qty: 60 TABLET | Refills: 0 | Status: SHIPPED | OUTPATIENT
Start: 2024-05-26

## 2024-05-26 RX ADMIN — IBUPROFEN 800 MG: 800 TABLET, FILM COATED ORAL at 06:05

## 2024-05-26 RX ADMIN — ACETAMINOPHEN 650 MG: 325 TABLET, FILM COATED ORAL at 01:02

## 2024-05-26 RX ADMIN — ACETAMINOPHEN 650 MG: 325 TABLET, FILM COATED ORAL at 08:09

## 2024-05-26 RX ADMIN — DOCUSATE SODIUM 100 MG: 100 CAPSULE, LIQUID FILLED ORAL at 08:09

## 2024-05-26 RX ADMIN — ESCITALOPRAM OXALATE 10 MG: 5 TABLET, FILM COATED ORAL at 08:09

## 2024-05-26 ASSESSMENT — ACTIVITIES OF DAILY LIVING (ADL)
ADLS_ACUITY_SCORE: 18

## 2024-05-26 NOTE — PLAN OF CARE
Goal Outcome Evaluation:      Plan of Care Reviewed With: patient    Overall Patient Progress: improving    Vital signs WDL. Postpartum checks: WDL. Pt eating and drinking without issue. Pt ambulating and voiding independently. Pt performing self-cares. Pt medicated for pain during the shift. Breast and formula feeding, it is going well. Pt is also pumping. Positive attachment behaviors observed with infant. Support person, Surya, present.

## 2024-05-26 NOTE — DISCHARGE INSTRUCTIONS
Warning Signs after Having a Baby    Keep this paper on your fridge or somewhere else where you can see it.    Call your provider if you have any of these symptoms up to 12 weeks after having your baby.    Thoughts of hurting yourself or your baby  Pain in your chest or trouble breathing  Severe headache not helped by pain medicine  Eyesight concerns (blurry vision, seeing spots or flashes of light, other changes to eyesight)  Fainting, shaking or other signs of a seizure    Call 9-1-1 if you feel that it is an emergency.     The symptoms below can happen to anyone after giving birth. They can be very serious. Call your provider if you have any of these warning signs.    My provider s phone number: _______________________    Losing too much blood (hemorrhage)    Call your provider if you soak through a pad in less than an hour or pass blood clots bigger than a golf ball. These may be signs that you are bleeding too much.    Blood clots in the legs or lungs    After you give birth, your body naturally clots its blood to help prevent blood loss. Sometimes this increased clotting can happen in other areas of the body, like the legs or lungs. This can block your blood flow and be very dangerous.     Call your provider if you:  Have a red, swollen spot on the back of your leg that is warm or painful when you touch it.   Are coughing up blood.     Infection    Call your provider if you have any of these symptoms:  Fever of 100.4 F (38 C) or higher.  Pain or redness around your stitches if you had an incision.   Any yellow, white, or green fluid coming from places where you had stitches or surgery.    Mood Problems (postpartum depression)    Many people feel sad or have mood changes after having a baby. But for some people, these mood swings are worse.     Call your provider right away if you feel so anxious or nervous that you can't care for yourself or your baby.    Preeclampsia (high blood pressure)    Even if you  "didn't have high blood pressure when you were pregnant, you are at risk for the high blood pressure disease called preeclampsia. This risk can last up to 12 weeks after giving birth.     Call your provider if you have:   Pain on your right side under your rib cage  Sudden swelling in the hands and face    Remember: You know your body. If something doesn't feel right, get medical help.     For informational purposes only. Not to replace the advice of your health care provider. Copyright 2020 [employer] [facility]. All rights reserved. Clinically reviewed by Lady Smallwood, RNC-OB, MSN. Flight Steward [id number] - Rev .    Postpartum Care at Home With Your Baby: Care Instructions  Overview     After childbirth (postpartum period), your body goes through many changes as you recover. In these weeks after delivery, try to take good care of yourself. Get rest whenever you can and accept help from others.  It may take 4 to 6 weeks to feel like yourself again, and possibly longer if you had a  birth. You may feel sore or very tired as you recover. After delivery, you may continue to have contractions as the uterus returns to the size it was before your pregnancy. You will also have some vaginal bleeding. And you may have pain around the vagina as you heal. Several days after delivery you may also have pain and swelling in your breasts as they fill with milk. There are things you can do at home to help ease these discomforts.  After childbirth, it's common to feel emotional. You may feel irritable, cry easily, and feel happy one minute and sad the next. This is called the \"baby blues.\" Hormone changes are one cause of these emotional changes. These feelings usually get better within a couple of weeks. If they don't, talk to your doctor or midwife.  In the first couple of weeks after you give birth, your doctor or midwife may want to check in with you and make a plan for follow-up care. You will likely have a " complete postpartum visit in the first 3 months after delivery. At that time, your doctor or midwife will check on your recovery and see how you're doing. But if you have questions or concerns before then, you can always call your doctor or midwife.  Follow-up care is a key part of your treatment and safety. Be sure to make and go to all appointments, and call your doctor if you are having problems. It's also a good idea to know your test results and keep a list of the medicines you take.  How can you care for yourself at home?  Taking care of your body  Use pads instead of tampons for bleeding. After birth, you will have bloody vaginal discharge. You may also pass some blood clots that shouldn't be bigger than an egg. Over the next 6 weeks or so, your bleeding should decrease a little every day and slowly change to a pinkish and then whitish discharge.  For cramps or mild pain, try an over-the-counter pain medicine, such as acetaminophen (Tylenol) or ibuprofen (Advil, Motrin). Read and follow all instructions on the label.  To ease pain around the vagina or from hemorrhoids:  Put ice or a cold pack on the area for 10 to 20 minutes at a time. Put a thin cloth between the ice and your skin.  Try sitting in a few inches of warm water (sitz bath) when you can or after bowel movements.  Clean yourself with a gentle squeeze of warm water from a bottle instead of wiping with toilet paper.  Use witch hazel or hemorrhoid pads (such as Tucks).  Try using a cold compress for sore and swollen breasts. And wear a supportive bra that fits.  Ease constipation by drinking plenty of fluids and eating high-fiber foods. Ask your doctor or midwife about over-the-counter stool softeners.  Activity  Rest when you can.  Ask for help from family or friends when you need it.  If you can, have another adult in your home for at least 2 or 3 days after birth.  When you feel ready, try to get some exercise every day. For many people, walking  is a good choice. Don't do any heavy exercise until your doctor or midwife says it's okay.  Ask your doctor or midwife when it is okay to have vaginal sex.  If you don't want to get pregnant, talk to your doctor or midwife about birth control options. You can get pregnant even before your period returns. Also, you can get pregnant while you are breastfeeding.  Talk to your doctor or midwife if you want to get pregnant again. They can talk to you about when it is safe.  Emotional health  It's normal to have some sadness, anxiety, and mood swings after delivery. It may help to talk with a trusted friend or family member. You can also call the Maternal Mental Health Hotline at 2-345-[facility] (1-703.295.5090) for support. If these mood changes last more than a couple of weeks, talk to your doctor or midwife.  When should you call for help?  Share this information with your partner, family, or a friend. They can help you watch for warning signs.  Call 911  anytime you think you may need emergency care. For example, call if:    You feel you cannot stop from hurting yourself, your baby, or someone else.     You passed out (lost consciousness).     You have chest pain, are short of breath, or cough up blood.     You have a seizure.   Where to get help 24 hours a day, 7 days a week   If you or someone you know talks about suicide, self-harm, a mental health crisis, a substance use crisis, or any other kind of emotional distress, get help right away. You can:    Call the Suicide and Crisis Lifeline at 308.     Call 4-902-349-TALK (1-337.272.1313).     Text HOME to 588902 to access the Crisis Text Line.   Consider saving these numbers in your phone.  Go to [employer] for more information or to chat online.  Call your doctor or midwife now or seek immediate medical care if:    You have signs of hemorrhage (too much bleeding), such as:  Heavy vaginal bleeding. This means that you are soaking through one or more pads in an  "hour. Or you pass blood clots bigger than an egg.  Feeling dizzy or lightheaded, or you feel like you may faint.  Feeling so tired or weak that you cannot do your usual activities.  A fast or irregular heartbeat.  New or worse belly pain.     You have signs of infection, such as:  A fever.  Increased pain, swelling, warmth, or redness from an incision or wound.  Frequent or painful urination or blood in your urine.  Vaginal discharge that smells bad.  New or worse belly pain.     You have symptoms of a blood clot in your leg (called a deep vein thrombosis), such as:  Pain in the calf, back of the knee, thigh, or groin.  Swelling in the leg or groin.  A color change on the leg or groin. The skin may be reddish or purplish, depending on your usual skin color.     You have signs of preeclampsia, such as:  Sudden swelling of your face, hands, or feet.  New vision problems (such as dimness, blurring, or seeing spots).  A severe headache.     You have signs of heart failure, such as:  New or increased shortness of breath.  New or worse swelling in your legs, ankles, or feet.  Sudden weight gain, such as more than 2 to 3 pounds in a day or 5 pounds in a week.  Feeling so tired or weak that you cannot do your usual activities.     You had spinal or epidural pain relief and have:  New or worse back pain.  Increased pain, swelling, warmth, or redness at the injection site.  Tingling, weakness, or numbness in your legs or groin.   Watch closely for changes in your health, and be sure to contact your doctor or midwife if:    Your vaginal bleeding isn't decreasing.     You feel sad, anxious, or hopeless for more than a few days.     You are having problems with your breasts or breastfeeding.   Where can you learn more?  Go to https://www.healthwise.net/patiented  Enter Z768 in the search box to learn more about \"Postpartum Care at Home With Your Baby: Care Instructions.\"  Current as of: July 10, 2023               Content " Version: 14.0    7699-1777 [employer].   Care instructions adapted under license by your healthcare professional. If you have questions about a medical condition or this instruction, always ask your healthcare professional. [employer] disclaims any warranty or liability for your use of this information.

## 2024-05-26 NOTE — LACTATION NOTE
This note was copied from a baby's chart.  Follow Up Consult    Infant Name: Pietro     Infant's Primary Care Clinic: [facility]    Maternal Assessment: Breasts unchanged. Soraida reports no longer getting drops when she pumps or hand expresses, that maybe he's just taking it all?      Infant Assessment:  Pietro has age appropriate output and weight loss, they started giving him formula via bottle overnight due to consistent cueing for more during and after breastfeeding. He took 20-40 mL, reviewed typical volumes for feeds in their postpartum book.      Weight Change Since Birth: -2% at 2 day old      Feeding History: mom serenity h's breastfeeding well and formula feeding via bottle overnight.       Feeding Assessment: Did not witness feeding today though mom has been latching independently without difficulty. Present while pumping twice today, reviewed hands on techniques, both Initiate and Maintain functions of pump with how and when to switch, how to remove and place [employer] parts in machine and bring tubing and all parts home with them. Reviewed where to return when finished with rental pump.      Education:   [] Expected  feeding patterns in the first few days (pg. 38 of Your Guide to To Postpartum and [facility])/ the Second Night  [] Stages of milk production  [] Benefits of hand expression of colostrum  [] Early feeding cues     [] Benefits of feeding on cue  [] Benefits of skin to skin  [] Breastfeeding positions  [] Tips to get and maintain a deep latch  [] Nutritive vs.non-nutritive sucking  [] Gentle breast compressions as needed to enhance milk transfer  [] How to tell when baby is finished  [x] How to tell if baby is getting enough  [x] Expected  output  [x]  weight loss  [x] Infant Feeding Log  [] [employer] Breastfeeding/Pumping videos  [x] Signs breastfeeding is going well (comfortable latch, audible swallows, age appropriate output and weight loss)     [] Tips to prevent engorgement  [x] Signs of engorgement  [] Tips to manage engorgement  [x] Pumping recommendations: ideally each time he gets supplemental feed (minimum 4-6 times daily even without supplements to maximize supply). Present while pumping twice today, reviewed hands on techniques, both Initiate and Maintain functions of pump with how and when to switch, how to remove and place Symphony parts in machine and bring tubing and all parts home with them. Reviewed where to return when finished with rental pump.    [] [facility] breast pump part/infant feeding supplies cleaning recommendations  [x] Inpatient breastfeeding support  [x] Outpatient lactation resources    Handouts: Infant Feeding Log (Week 1, Your Guide to Postpartum &  Care Book) and [facility] [facility] Lactation Resources    Home Breast Pump: Recommend and Soraida accept Symphony rental pump due to history of very low milk supply, lack of breast changes in pregnancy.    Plan: Feed baby on cue at least 8 times daily and track/monitor on feeding log. Hands on pumping with hand expression at least 4-6 times daily and each time he gets formula supplement. Encouraged follow up with outpatient lactation consultant  within 1 week after discharge, will see lactation provider at their pediatric clinic.        Sophie Koch, RN, IBCLC   Lactation Consultant  [place]: [facility] [phone]  Office: [phone]          '

## 2024-05-26 NOTE — PLAN OF CARE
Goal Outcome Evaluation:       Pt VSS, postpartum assessment WDL.  Pain managed with tylenol and ibuprofen.  Pt is bonding well with baby and breastfeeding on cue.  Pt is ambulating independently, voiding spontaneously, and tolerating regular diet.  Continue with plan of care.       Problem: Adult Inpatient Plan of Care  Goal: Optimal Comfort and Wellbeing  Outcome: Progressing  Intervention: Provide Person-Centered Care  Recent Flowsheet Documentation  Taken 5/25/2024 2114 by Amita Altamirano RN  Trust Relationship/Rapport:   care explained   choices provided   questions answered   questions encouraged   thoughts/feelings acknowledged

## 2024-05-26 NOTE — PLAN OF CARE
Goal Outcome Evaluation:      Plan of Care Reviewed With: patient                 Problem: Postpartum (Vaginal Delivery)  Goal: Optimal Pain Control and Function  Outcome: Met   VSS. Afebrile. Up  ad nereida. Voiding and had a Bm already. Breast feeding better today.  Supplementing with formula per parent's choice. Will take rental pump home per lactation. No PIV found. Received PPD questionnaire and BC. Declined [facility] program, Dr. Acosta aware, see her provider note. Pt has own BP cuff and agreeable to do BP checks at home. BP parameter card explained and given to pt. Does have homecare ordered. Discharge instructions and meds reviewed and given to pt. Discharged home with baby.

## 2024-05-28 ENCOUNTER — TELEPHONE (OUTPATIENT)
Dept: MATERNAL FETAL MEDICINE | Facility: CLINIC | Age: 34
End: 2024-05-28
Payer: COMMERCIAL

## 2024-05-28 DIAGNOSIS — Z34.81 ENCOUNTER FOR SUPERVISION OF OTHER NORMAL PREGNANCY IN FIRST TRIMESTER: Primary | ICD-10-CM

## 2024-05-28 NOTE — TELEPHONE ENCOUNTER
Home Observation of Postpartum Elevated BP (HOPE-BP) Program     Soraida Johnson enrolled in the HOPE-BP Program on 5/26/2024,  days ago. Delivered on 5/24/2024 . Diagnosis: Gestational hypertension. Medication(s) prescribed:  no meds .  Patient reported the following blood pressures in the past 72 hours:        No data to display              LM introducing program and contact information for [facility].    KYLEIGH LUNA RN

## 2024-07-24 ENCOUNTER — MEDICAL CORRESPONDENCE (OUTPATIENT)
Dept: HEALTH INFORMATION MANAGEMENT | Facility: CLINIC | Age: 34
End: 2024-07-24
Payer: COMMERCIAL

## 2025-02-12 ENCOUNTER — MYC MEDICAL ADVICE (OUTPATIENT)
Dept: FAMILY MEDICINE | Facility: CLINIC | Age: 35
End: 2025-02-12

## 2025-02-12 DIAGNOSIS — R19.5 CHANGE IN STOOL CALIBER: Primary | ICD-10-CM

## 2025-02-12 DIAGNOSIS — R12 HEARTBURN: ICD-10-CM

## 2025-02-12 DIAGNOSIS — K59.00 CONSTIPATION, UNSPECIFIED CONSTIPATION TYPE: ICD-10-CM

## 2025-05-17 ENCOUNTER — HEALTH MAINTENANCE LETTER (OUTPATIENT)
Age: 35
End: 2025-05-17